# Patient Record
Sex: MALE | Race: WHITE | NOT HISPANIC OR LATINO | Employment: FULL TIME | URBAN - METROPOLITAN AREA
[De-identification: names, ages, dates, MRNs, and addresses within clinical notes are randomized per-mention and may not be internally consistent; named-entity substitution may affect disease eponyms.]

---

## 2022-10-05 ENCOUNTER — HOSPITAL ENCOUNTER (EMERGENCY)
Facility: HOSPITAL | Age: 52
Discharge: HOME | End: 2022-10-05
Attending: EMERGENCY MEDICINE | Admitting: EMERGENCY MEDICINE

## 2022-10-05 VITALS
SYSTOLIC BLOOD PRESSURE: 121 MMHG | BODY MASS INDEX: 25.59 KG/M2 | RESPIRATION RATE: 18 BRPM | DIASTOLIC BLOOD PRESSURE: 78 MMHG | HEIGHT: 71 IN | TEMPERATURE: 98 F | WEIGHT: 182.76 LBS | OXYGEN SATURATION: 96 % | HEART RATE: 63 BPM

## 2022-10-05 DIAGNOSIS — R55 NEAR SYNCOPE: ICD-10-CM

## 2022-10-05 DIAGNOSIS — M54.9 ACUTE BACK PAIN, UNSPECIFIED BACK LOCATION, UNSPECIFIED BACK PAIN LATERALITY: ICD-10-CM

## 2022-10-05 DIAGNOSIS — U07.1 COVID: Primary | ICD-10-CM

## 2022-10-05 LAB
ALBUMIN SERPL-MCNC: 4.6 G/DL (ref 3.5–5.2)
ALBUMIN/GLOB SERPL: 1.8 G/DL
ALP SERPL-CCNC: 84 U/L (ref 39–117)
ALT SERPL W P-5'-P-CCNC: 43 U/L (ref 1–41)
ANION GAP SERPL CALCULATED.3IONS-SCNC: 8.5 MMOL/L (ref 5–15)
AST SERPL-CCNC: 39 U/L (ref 1–40)
BASOPHILS # BLD AUTO: 0.03 10*3/MM3 (ref 0–0.2)
BASOPHILS NFR BLD AUTO: 0.6 % (ref 0–1.5)
BILIRUB SERPL-MCNC: 0.5 MG/DL (ref 0–1.2)
BUN SERPL-MCNC: 14 MG/DL (ref 6–20)
BUN/CREAT SERPL: 13.7 (ref 7–25)
CALCIUM SPEC-SCNC: 8.9 MG/DL (ref 8.6–10.5)
CHLORIDE SERPL-SCNC: 100 MMOL/L (ref 98–107)
CO2 SERPL-SCNC: 26.5 MMOL/L (ref 22–29)
CREAT SERPL-MCNC: 1.02 MG/DL (ref 0.76–1.27)
DEPRECATED RDW RBC AUTO: 44.2 FL (ref 37–54)
EGFRCR SERPLBLD CKD-EPI 2021: 89 ML/MIN/1.73
EOSINOPHIL # BLD AUTO: 0.05 10*3/MM3 (ref 0–0.4)
EOSINOPHIL NFR BLD AUTO: 1 % (ref 0.3–6.2)
ERYTHROCYTE [DISTWIDTH] IN BLOOD BY AUTOMATED COUNT: 12.9 % (ref 12.3–15.4)
GLOBULIN UR ELPH-MCNC: 2.5 GM/DL
GLUCOSE SERPL-MCNC: 140 MG/DL (ref 65–99)
HCT VFR BLD AUTO: 46.1 % (ref 37.5–51)
HGB BLD-MCNC: 16.3 G/DL (ref 13–17.7)
HOLD SPECIMEN: NORMAL
HOLD SPECIMEN: NORMAL
IMM GRANULOCYTES # BLD AUTO: 0.01 10*3/MM3 (ref 0–0.05)
IMM GRANULOCYTES NFR BLD AUTO: 0.2 % (ref 0–0.5)
LYMPHOCYTES # BLD AUTO: 1.46 10*3/MM3 (ref 0.7–3.1)
LYMPHOCYTES NFR BLD AUTO: 29.6 % (ref 19.6–45.3)
MAGNESIUM SERPL-MCNC: 2.1 MG/DL (ref 1.6–2.6)
MCH RBC QN AUTO: 33.5 PG (ref 26.6–33)
MCHC RBC AUTO-ENTMCNC: 35.4 G/DL (ref 31.5–35.7)
MCV RBC AUTO: 94.9 FL (ref 79–97)
MONOCYTES # BLD AUTO: 0.36 10*3/MM3 (ref 0.1–0.9)
MONOCYTES NFR BLD AUTO: 7.3 % (ref 5–12)
NEUTROPHILS NFR BLD AUTO: 3.02 10*3/MM3 (ref 1.7–7)
NEUTROPHILS NFR BLD AUTO: 61.3 % (ref 42.7–76)
NRBC BLD AUTO-RTO: 0 /100 WBC (ref 0–0.2)
PLATELET # BLD AUTO: 122 10*3/MM3 (ref 140–450)
PMV BLD AUTO: 10.9 FL (ref 6–12)
POTASSIUM SERPL-SCNC: 4.6 MMOL/L (ref 3.5–5.2)
PROT SERPL-MCNC: 7.1 G/DL (ref 6–8.5)
QT INTERVAL: 404 MS
RBC # BLD AUTO: 4.86 10*6/MM3 (ref 4.14–5.8)
SODIUM SERPL-SCNC: 135 MMOL/L (ref 136–145)
TROPONIN T SERPL-MCNC: <0.01 NG/ML (ref 0–0.03)
WBC NRBC COR # BLD: 4.93 10*3/MM3 (ref 3.4–10.8)
WHOLE BLOOD HOLD COAG: NORMAL
WHOLE BLOOD HOLD SPECIMEN: NORMAL

## 2022-10-05 PROCEDURE — 93005 ELECTROCARDIOGRAM TRACING: CPT

## 2022-10-05 PROCEDURE — 83735 ASSAY OF MAGNESIUM: CPT | Performed by: EMERGENCY MEDICINE

## 2022-10-05 PROCEDURE — 84484 ASSAY OF TROPONIN QUANT: CPT | Performed by: EMERGENCY MEDICINE

## 2022-10-05 PROCEDURE — 80053 COMPREHEN METABOLIC PANEL: CPT | Performed by: EMERGENCY MEDICINE

## 2022-10-05 PROCEDURE — 85025 COMPLETE CBC W/AUTO DIFF WBC: CPT | Performed by: EMERGENCY MEDICINE

## 2022-10-05 PROCEDURE — 93010 ELECTROCARDIOGRAM REPORT: CPT | Performed by: SPECIALIST

## 2022-10-05 PROCEDURE — 99284 EMERGENCY DEPT VISIT MOD MDM: CPT

## 2022-10-05 PROCEDURE — 93005 ELECTROCARDIOGRAM TRACING: CPT | Performed by: EMERGENCY MEDICINE

## 2022-10-05 RX ORDER — SODIUM CHLORIDE 0.9 % (FLUSH) 0.9 %
10 SYRINGE (ML) INJECTION AS NEEDED
Status: DISCONTINUED | OUTPATIENT
Start: 2022-10-05 | End: 2022-10-05 | Stop reason: HOSPADM

## 2022-10-05 RX ADMIN — SODIUM CHLORIDE 1000 ML: 9 INJECTION, SOLUTION INTRAVENOUS at 07:02

## 2022-10-05 NOTE — ED PROVIDER NOTES
Time: 7:26 AM EDT  Arrived by: EMS  Chief Complaint: Syncope  History provided by: patient/EMS  History is limited by: N/A    History of Present Illness:  Patient is a 52 y.o. year old male who presents to the emergency department with syncope. Patient states he is day five of Covid and was diagnosed at Acute Care. Patient states he was drinking coffee this morning when he began having hot flashes after he stretched and began having back pain and he broke into a cold sweat. Patient states he did not pass out. Patient states he feels fine and is back to normal right now. Patient states has pain from Covid from butt to back to crown of skull. Patient states vomited earlier, but is not nausea right now. Patient states he has Ibuprofen for the pain.    Patient denies taking blood pressure medicine.      History provided by:  Patient and EMS personnel   used: No        Patient Care Team  Primary Care Provider: Chrissy Aragon APRN    Past Medical History:     No Known Allergies  History reviewed. No pertinent past medical history.  No past surgical history on file.  History reviewed. No pertinent family history.    Home Medications:  Prior to Admission medications    Not on File        Social History:      Recent travel: not applicable    Review of Systems:  Review of Systems   Constitutional: Negative for chills and fever.        Hot flashes and cold sweat   HENT: Negative for sore throat.    Eyes: Negative for photophobia.   Respiratory: Negative for shortness of breath.    Cardiovascular: Negative for chest pain.   Gastrointestinal: Positive for nausea (Resolved) and vomiting (Resolved). Negative for abdominal pain and diarrhea.   Genitourinary: Negative for dysuria.   Musculoskeletal: Positive for back pain. Negative for neck pain.   Skin: Negative for wound.   Neurological: Positive for syncope. Negative for headaches.   All other systems reviewed and are negative.       Physical Exam:  BP  "112/85   Pulse 72   Temp 98 °F (36.7 °C) (Oral)   Resp 18   Ht 180.3 cm (71\")   Wt 82.9 kg (182 lb 12.2 oz)   SpO2 96%   BMI 25.49 kg/m²     Physical Exam  Vitals and nursing note reviewed.   Constitutional:       General: He is not in acute distress.  HENT:      Head: Normocephalic and atraumatic.   Eyes:      Extraocular Movements: Extraocular movements intact.   Cardiovascular:      Rate and Rhythm: Normal rate and regular rhythm.      Pulses: Normal pulses.      Heart sounds:     No friction rub. No gallop.   Pulmonary:      Effort: Pulmonary effort is normal. No respiratory distress.      Breath sounds: Normal breath sounds. No decreased breath sounds, wheezing, rhonchi or rales.   Abdominal:      General: Abdomen is flat. Bowel sounds are normal. There is no distension.      Palpations: Abdomen is soft.      Tenderness: There is no abdominal tenderness. There is no guarding or rebound.   Musculoskeletal:         General: Normal range of motion.      Cervical back: Normal range of motion and neck supple.   Skin:     General: Skin is warm and dry.      Capillary Refill: Capillary refill takes less than 2 seconds.   Neurological:      Mental Status: He is alert and oriented to person, place, and time. Mental status is at baseline.                Medications in the Emergency Department:  Medications   sodium chloride 0.9 % flush 10 mL (has no administration in time range)   sodium chloride 0.9 % bolus 1,000 mL (0 mL Intravenous Stopped 10/5/22 0833)        Labs  Lab Results (last 24 hours)     Procedure Component Value Units Date/Time    CBC & Differential [[id number]]  (Abnormal) Collected: 10/05/22 0645    Specimen: Blood Updated: 10/05/22 0658    Narrative:      The following orders were created for panel order CBC & Differential.  Procedure                               Abnormality         Status                     ---------                               -----------         ------                   "   CBC Auto Differential[[id number]]        Abnormal            Final result               Scan Slide[[id number]]                                                                    Please view results for these tests on the individual orders.    Comprehensive Metabolic Panel [[id number]]  (Abnormal) Collected: 10/05/22 0645    Specimen: Blood Updated: 10/05/22 0715     Glucose 140 mg/dL      BUN 14 mg/dL      Creatinine 1.02 mg/dL      Sodium 135 mmol/L      Potassium 4.6 mmol/L      Comment: Slight hemolysis detected by analyzer. Results may be affected.        Chloride 100 mmol/L      CO2 26.5 mmol/L      Calcium 8.9 mg/dL      Total Protein 7.1 g/dL      Albumin 4.60 g/dL      ALT (SGPT) 43 U/L      AST (SGOT) 39 U/L      Alkaline Phosphatase 84 U/L      Total Bilirubin 0.5 mg/dL      Globulin 2.5 gm/dL      A/G Ratio 1.8 g/dL      BUN/Creatinine Ratio 13.7     Anion Gap 8.5 mmol/L      eGFR 89.0 mL/min/1.73      Comment: National Kidney Foundation and American Society of Nephrology (ASN) Task Force recommended calculation based on the Chronic Kidney Disease Epidemiology Collaboration (CKD-EPI) equation refit without adjustment for race.       Narrative:      GFR Normal >60  Chronic Kidney Disease <60  Kidney Failure <15      Magnesium [[id number]]  (Normal) Collected: 10/05/22 0645    Specimen: Blood Updated: 10/05/22 0715     Magnesium 2.1 mg/dL     Troponin [[id number]]  (Normal) Collected: 10/05/22 0645    Specimen: Blood Updated: 10/05/22 0715     Troponin T <0.010 ng/mL     Narrative:      Troponin T Reference Range:  <= 0.03 ng/mL-   Negative for AMI  >0.03 ng/mL-     Abnormal for myocardial necrosis.  Clinicians would have to utilize clinical acumen, EKG, Troponin and serial changes to determine if it is an Acute Myocardial Infarction or myocardial injury due to an underlying chronic condition.       Results may be falsely decreased if patient taking Biotin.      CBC Auto Differential [[id number]]  (Abnormal)  Collected: 10/05/22 0645    Specimen: Blood Updated: 10/05/22 0658     WBC 4.93 10*3/mm3      RBC 4.86 10*6/mm3      Hemoglobin 16.3 g/dL      Hematocrit 46.1 %      MCV 94.9 fL      MCH 33.5 pg      MCHC 35.4 g/dL      RDW 12.9 %      RDW-SD 44.2 fl      MPV 10.9 fL      Platelets 122 10*3/mm3      Neutrophil % 61.3 %      Lymphocyte % 29.6 %      Monocyte % 7.3 %      Eosinophil % 1.0 %      Basophil % 0.6 %      Immature Grans % 0.2 %      Neutrophils, Absolute 3.02 10*3/mm3      Lymphocytes, Absolute 1.46 10*3/mm3      Monocytes, Absolute 0.36 10*3/mm3      Eosinophils, Absolute 0.05 10*3/mm3      Basophils, Absolute 0.03 10*3/mm3      Immature Grans, Absolute 0.01 10*3/mm3      nRBC 0.0 /100 WBC            Imaging:  No Radiology Exams Resulted Within Past 24 Hours    Procedures:  Procedures    Progress  ED Course as of 10/05/22 0901   Wed Oct 05, 2022   0652 EKG:    Rhythm: Normal sinus rhythm  Rate: 66  Intervals: IVCD  T-wave: Low amplitude  ST Segment: Nonspecific    EKG Comparison: Not available    Interpreted by me   [NL]      ED Course User Index  [NL] Orlando Magdaleno DO                            Medical Decision Making:  MDM   52-year-old male patient who tested positive for COVID 5 days ago presented this morning after having a near syncopal episode.  Patient states he was drinking coffee and had a hot flash and then a cold chill.  He became nauseated and vomited.  He felt like he might pass out but did not.  Patient's exam is normal.  After receiving 1 Liter of IV fluid he states he feels fine.  He states he has had some back pain over the last 5 days.  He declined taking any pain medication.  Stable for discharge.      Final diagnoses:   COVID   Near syncope   Acute back pain, unspecified back location, unspecified back pain laterality        Disposition:  ED Disposition     ED Disposition   Discharge    Condition   Stable    Comment   --             This medical record created using voice recognition  software and a virtual scribe.    Documentation assistance provided by James Tinoco acting as scribe for Orlando Magdaleno DO, MD. Information recorded by the scribe was done at my direction and has been verified and validated by me.         James Tinoco  10/05/22 0731       James Tinoco  10/05/22 0824       Orlando Magdaleno DO  10/05/22 0901

## 2023-05-02 ENCOUNTER — HOSPITAL ENCOUNTER (OUTPATIENT)
Dept: OTHER | Facility: HOSPITAL | Age: 53
Discharge: HOME | End: 2023-05-02

## 2025-01-17 RX ORDER — PREDNISONE 10 MG/1
10 TABLET ORAL DAILY
Qty: 14 TABLET | Refills: 0 | Status: SHIPPED | OUTPATIENT
Start: 2025-01-17

## 2025-01-17 RX ORDER — AZITHROMYCIN 250 MG/1
TABLET, FILM COATED ORAL
Qty: 6 TABLET | Refills: 0 | Status: SHIPPED | OUTPATIENT
Start: 2025-01-17 | End: 2025-01-22

## 2025-01-23 ENCOUNTER — OFFICE VISIT (OUTPATIENT)
Dept: INTERNAL MEDICINE | Age: 55
End: 2025-01-23
Payer: COMMERCIAL

## 2025-01-23 VITALS
HEART RATE: 80 BPM | WEIGHT: 182 LBS | TEMPERATURE: 98.7 F | SYSTOLIC BLOOD PRESSURE: 138 MMHG | DIASTOLIC BLOOD PRESSURE: 93 MMHG | OXYGEN SATURATION: 95 % | BODY MASS INDEX: 25.48 KG/M2 | HEIGHT: 71 IN

## 2025-01-23 DIAGNOSIS — Z12.5 PROSTATE CANCER SCREENING: ICD-10-CM

## 2025-01-23 DIAGNOSIS — R21 RASH: ICD-10-CM

## 2025-01-23 DIAGNOSIS — E78.2 MIXED HYPERLIPIDEMIA: Primary | ICD-10-CM

## 2025-01-23 DIAGNOSIS — Z76.89 ENCOUNTER TO ESTABLISH CARE: ICD-10-CM

## 2025-01-23 DIAGNOSIS — E55.9 VITAMIN D DEFICIENCY: ICD-10-CM

## 2025-01-23 DIAGNOSIS — L30.9 ECZEMA, UNSPECIFIED TYPE: ICD-10-CM

## 2025-01-23 PROCEDURE — 99204 OFFICE O/P NEW MOD 45 MIN: CPT | Performed by: NURSE PRACTITIONER

## 2025-01-23 RX ORDER — PREDNISONE 10 MG/1
10 TABLET ORAL DAILY
Qty: 14 TABLET | Refills: 0 | Status: SHIPPED | OUTPATIENT
Start: 2025-01-23

## 2025-01-23 RX ORDER — BETAMETHASONE DIPROPIONATE 0.5 MG/G
1 CREAM TOPICAL 2 TIMES DAILY
Qty: 45 G | Refills: 0 | Status: SHIPPED | OUTPATIENT
Start: 2025-01-23

## 2025-01-23 NOTE — PROGRESS NOTES
"Chief Complaint  Cough (Wife had bronchitis last week but feels better now ), Establish Care, and Herpes Zoster (Pt thinks he has shingles now. He has a rash on his neck and lower back )    Subjective      Darrius Florentino is a 54 y.o. male who presents to [facility]     History of Present Illness  The patient presents to be established here at the [facility] office he is prior patient of [facility] and would like to establish with [facility].  Recent episode of bronchitis currently on Z-Live and prednisone but has noticed a persistent rash to his neck which he originally thought was a ringworm he has been using antifungal cream and it is progressively gotten worse     He initially suspected a ringworm infection last week but has since ruled this out due to the persistence of symptoms despite treatment. The rash, which began as a circular lesion, has now spread and is characterized by a burning sensation and a rough texture similar to sandpaper. He reports no radiating pain down his shoulder or arm. The rash occasionally itches and burns without any identifiable trigger, or lesions.  No blisters no drainage no bumps but feels rough in nature.      His cough has improved he is a smoker recently diagnosed with bronchitis was on Zithromax and prednisone doing much better no wheezing  or increased shortness of air continues with mild congestion.    He has a history of hyperlipidemia vitamin D deficiency and is a current smoker he would like to have lab work and have his physical we will get it set up        FAMILY HISTORY  His mother had diabetes during her cancer treatment. His brother is diabetic.    MEDICATIONS  Current: Z-Live, steroid (10 mg), Lotrimin or Lotrisone         Objective   Vital Signs:   Vitals:    01/23/25 1454   BP: 138/93   Pulse: 80   Temp: 98.7 °F (37.1 °C)   SpO2: 95%   Weight: 82.6 kg (182 lb)   Height: 180.3 cm (71\")     Body mass index is 25.38 kg/m².    Wt Readings " from Last 3 Encounters:   01/23/25 82.6 kg (182 lb)   10/05/22 82.9 kg (182 lb 12.2 oz)     BP Readings from Last 3 Encounters:   01/23/25 138/93   10/05/22 121/78       Health Maintenance   Topic Date Due    BMI FOLLOWUP  Never done    TDAP/TD VACCINES (1 - Tdap) Never done    ZOSTER VACCINE (1 of 2) Never done    INFLUENZA VACCINE  Never done    COVID-19 Vaccine (1 - 2024-25 season) Never done    ANNUAL PHYSICAL  Never done    LIPID PANEL  03/07/2025    COLORECTAL CANCER SCREENING  04/21/2026    HEPATITIS C SCREENING  Completed    Pneumococcal Vaccine 0-64  Aged Out       Past Medical History:   Diagnosis Date    Allergic     Seizures      Past Surgical History:   Procedure Laterality Date    HERNIA REPAIR       History reviewed. No pertinent family history.  Social History     Socioeconomic History    Marital status:    Tobacco Use    Smoking status: Every Day     Current packs/day: 1.00     Average packs/day: 1 pack/day for 38.9 years (38.9 ttl pk-yrs)     Types: Cigarettes     Start date: 3/11/1986     Passive exposure: Current    Smokeless tobacco: Never   Vaping Use    Vaping status: Never Used   Substance and Sexual Activity    Alcohol use: Never    Drug use: Never    Sexual activity: Defer       Current Outpatient Medications   Medication Instructions    betamethasone dipropionate (DIPROSONE) 0.05 % cream 1 Application, Topical, 2 Times Daily    predniSONE (DELTASONE) 10 mg, Oral, Daily       Medications Discontinued During This Encounter   Medication Reason    azithromycin (Zithromax Z-Live) 250 MG tablet *Therapy completed    predniSONE (DELTASONE) 10 MG tablet Historical Med - Therapy completed        Physical Exam  Vitals and nursing note reviewed.   Constitutional:       Appearance: Normal appearance.   HENT:      Head: Normocephalic.   Eyes:      Extraocular Movements: Extraocular movements intact.      Pupils: Pupils are equal, round, and reactive to light.   Cardiovascular:      Rate and  Rhythm: Normal rate and regular rhythm.      Pulses: Normal pulses.   Pulmonary:      Effort: Pulmonary effort is normal.      Breath sounds: Normal breath sounds.   Abdominal:      Palpations: Abdomen is soft.   Musculoskeletal:         General: Normal range of motion.      Cervical back: Normal range of motion.   Skin:     General: Skin is warm and dry.      Comments: Dry erythematous patch noted on left lateral neck does not follow dermatome no lesions more plaque than scaly area   Neurological:      General: No focal deficit present.      Mental Status: He is alert.   Psychiatric:         Mood and Affect: Mood normal.         Behavior: Behavior normal.         Thought Content: Thought content normal.          Physical Exam  Lungs were auscultated.       Result Review :  The following data was reviewed by: ZOIE Christianson on 01/23/2025:    Labs  No visits with results within 2 Month(s) from this visit.   Latest known visit with results is:   Admission on 10/05/2022, Discharged on 10/05/2022   Component Date Value Ref Range Status    QT Interval 10/05/2022 404  ms Final    Glucose 10/05/2022 140 (H)  65 - 99 mg/dL Final    BUN 10/05/2022 14  6 - 20 mg/dL Final    Creatinine 10/05/2022 1.02  0.76 - 1.27 mg/dL Final    Sodium 10/05/2022 135 (L)  136 - 145 mmol/L Final    Potassium 10/05/2022 4.6  3.5 - 5.2 mmol/L Final    Slight hemolysis detected by analyzer. Results may be affected.    Chloride 10/05/2022 100  98 - 107 mmol/L Final    CO2 10/05/2022 26.5  22.0 - 29.0 mmol/L Final    Calcium 10/05/2022 8.9  8.6 - 10.5 mg/dL Final    Total Protein 10/05/2022 7.1  6.0 - 8.5 g/dL Final    Albumin 10/05/2022 4.60  3.50 - 5.20 g/dL Final    ALT (SGPT) 10/05/2022 43 (H)  1 - 41 U/L Final    AST (SGOT) 10/05/2022 39  1 - 40 U/L Final    Alkaline Phosphatase 10/05/2022 84  39 - 117 U/L Final    Total Bilirubin 10/05/2022 0.5  0.0 - 1.2 mg/dL Final    Globulin 10/05/2022 2.5  gm/dL Final    A/G Ratio 10/05/2022  1.8  g/dL Final    BUN/Creatinine Ratio 10/05/2022 13.7  7.0 - 25.0 Final    Anion Gap 10/05/2022 8.5  5.0 - 15.0 mmol/L Final    eGFR 10/05/2022 89.0  >60.0 mL/min/1.73 Final    National Kidney Foundation and American Society of Nephrology (ASN) Task Force recommended calculation based on the Chronic Kidney Disease Epidemiology Collaboration (CKD-EPI) equation refit without adjustment for race.    Magnesium 10/05/2022 2.1  1.6 - 2.6 mg/dL Final    Troponin T 10/05/2022 <0.010  0.000 - 0.030 ng/mL Final    Extra Tube 10/05/2022 Hold for add-ons.   Final    Auto resulted.    Extra Tube 10/05/2022 hold for add-on   Final    Auto resulted    Extra Tube 10/05/2022 Hold for add-ons.   Final    Auto resulted.    Extra Tube 10/05/2022 Hold for add-ons.   Final    Auto resulted    WBC 10/05/2022 4.93  3.40 - 10.80 10*3/mm3 Final    RBC 10/05/2022 4.86  4.14 - 5.80 10*6/mm3 Final    Hemoglobin 10/05/2022 16.3  13.0 - 17.7 g/dL Final    Hematocrit 10/05/2022 46.1  37.5 - 51.0 % Final    MCV 10/05/2022 94.9  79.0 - 97.0 fL Final    MCH 10/05/2022 33.5 (H)  26.6 - 33.0 pg Final    MCHC 10/05/2022 35.4  31.5 - 35.7 g/dL Final    RDW 10/05/2022 12.9  12.3 - 15.4 % Final    RDW-SD 10/05/2022 44.2  37.0 - 54.0 fl Final    MPV 10/05/2022 10.9  6.0 - 12.0 fL Final    Platelets 10/05/2022 122 (L)  140 - 450 10*3/mm3 Final    Neutrophil % 10/05/2022 61.3  42.7 - 76.0 % Final    Lymphocyte % 10/05/2022 29.6  19.6 - 45.3 % Final    Monocyte % 10/05/2022 7.3  5.0 - 12.0 % Final    Eosinophil % 10/05/2022 1.0  0.3 - 6.2 % Final    Basophil % 10/05/2022 0.6  0.0 - 1.5 % Final    Immature Grans % 10/05/2022 0.2  0.0 - 0.5 % Final    Neutrophils, Absolute 10/05/2022 3.02  1.70 - 7.00 10*3/mm3 Final    Lymphocytes, Absolute 10/05/2022 1.46  0.70 - 3.10 10*3/mm3 Final    Monocytes, Absolute 10/05/2022 0.36  0.10 - 0.90 10*3/mm3 Final    Eosinophils, Absolute 10/05/2022 0.05  0.00 - 0.40 10*3/mm3 Final    Basophils, Absolute 10/05/2022 0.03   0.00 - 0.20 10*3/mm3 Final    Immature Grans, Absolute 10/05/2022 0.01  0.00 - 0.05 10*3/mm3 Final    nRBC 10/05/2022 0.0  0.0 - 0.2 /100 WBC Final       Imaging  No Images in the past 120 days found..    Results         Procedures       ASSESSMENT & PLAN  Diagnoses and all orders for this visit:    1. Mixed hyperlipidemia (Primary)  -     Vitamin D,25-Hydroxy; Future  -     TSH Rfx On Abnormal To Free T4; Future  -     Lipid Panel; Future  -     Comprehensive Metabolic Panel; Future  -     CBC & Differential; Future  -     Hepatitis C Antibody; Future  -     Hemoglobin A1c; Future  -     PSA Screen; Future    2. Vitamin D deficiency  -     Vitamin D,25-Hydroxy; Future  -     TSH Rfx On Abnormal To Free T4; Future  -     Lipid Panel; Future  -     Comprehensive Metabolic Panel; Future  -     CBC & Differential; Future  -     Hepatitis C Antibody; Future  -     Hemoglobin A1c; Future  -     PSA Screen; Future    3. Prostate cancer screening  -     Vitamin D,25-Hydroxy; Future  -     TSH Rfx On Abnormal To Free T4; Future  -     Lipid Panel; Future  -     Comprehensive Metabolic Panel; Future  -     CBC & Differential; Future  -     Hepatitis C Antibody; Future  -     Hemoglobin A1c; Future  -     PSA Screen; Future    4. Encounter to establish care  -     Vitamin D,25-Hydroxy; Future  -     TSH Rfx On Abnormal To Free T4; Future  -     Lipid Panel; Future  -     Comprehensive Metabolic Panel; Future  -     CBC & Differential; Future  -     Hepatitis C Antibody; Future  -     Hemoglobin A1c; Future  -     PSA Screen; Future    5. Rash    6. Eczema, unspecified type    Other orders  -     betamethasone dipropionate (DIPROSONE) 0.05 % cream; Apply 1 Application topically to the appropriate area as directed 2 (Two) Times a Day.  Dispense: 45 g; Refill: 0  -     predniSONE (DELTASONE) 10 MG tablet; Take 1 tablet by mouth Daily.  Dispense: 14 tablet; Refill: 0         Assessment & Plan  1. Rash.  The absence of vesicles  and a dermatome pattern suggests that the rash is unlikely to be shingles. He is advised to discontinue the use of Lotrimin or Lotrisone cream and instead apply a different prescribed cream to the affected area. The current steroid regimen will be extended to see if it helps. If there is no improvement in the rash, a referral to dermatology may be considered.    2. Elevated cholesterol.  Blood work will be ordered to monitor his cholesterol levels. He is advised to complete the blood work within the next few weeks.    3. Cough.  His cough has significantly improved with the current treatment of a Z-Live and steroids.    Follow-up  The patient will follow up for a wellness visit.                  FOLLOW UP  Return in about 2 weeks (around 2/6/2025) for Annual physical.  Patient was given instructions and counseling regarding his condition or for health maintenance advice. Please see specific information pulled into the AVS if appropriate.     Patient or patient representative verbalized consent for the use of Ambient Listening during the visit with  ZOIE Christianson for chart documentation. 1/23/2025  15:24 EST    ZOIE Christianson  01/23/25  15:24 EST

## 2025-01-23 NOTE — PATIENT INSTRUCTIONS
Will extend steroids for rash  Use the steroid cream sparingly twice a day to the area  See back in 2 weeks after lab work for annual physical  Encouraged not to smoke

## 2025-02-10 ENCOUNTER — LAB (OUTPATIENT)
Dept: LAB | Facility: HOSPITAL | Age: 55
End: 2025-02-10
Payer: COMMERCIAL

## 2025-02-10 DIAGNOSIS — E55.9 VITAMIN D DEFICIENCY: ICD-10-CM

## 2025-02-10 DIAGNOSIS — Z76.89 ENCOUNTER TO ESTABLISH CARE: ICD-10-CM

## 2025-02-10 DIAGNOSIS — Z12.5 PROSTATE CANCER SCREENING: ICD-10-CM

## 2025-02-10 DIAGNOSIS — E78.2 MIXED HYPERLIPIDEMIA: ICD-10-CM

## 2025-02-10 LAB
25(OH)D3 SERPL-MCNC: 13.8 NG/ML (ref 30–100)
ALBUMIN SERPL-MCNC: 4.3 G/DL (ref 3.5–5.2)
ALBUMIN/GLOB SERPL: 1.7 G/DL
ALP SERPL-CCNC: 88 U/L (ref 39–117)
ALT SERPL W P-5'-P-CCNC: 25 U/L (ref 1–41)
ANION GAP SERPL CALCULATED.3IONS-SCNC: 6 MMOL/L (ref 5–15)
AST SERPL-CCNC: 27 U/L (ref 1–40)
BASOPHILS # BLD AUTO: 0.07 10*3/MM3 (ref 0–0.2)
BASOPHILS NFR BLD AUTO: 1 % (ref 0–1.5)
BILIRUB SERPL-MCNC: 0.4 MG/DL (ref 0–1.2)
BUN SERPL-MCNC: 7 MG/DL (ref 6–20)
BUN/CREAT SERPL: 7.7 (ref 7–25)
CALCIUM SPEC-SCNC: 9.5 MG/DL (ref 8.6–10.5)
CHLORIDE SERPL-SCNC: 103 MMOL/L (ref 98–107)
CHOLEST SERPL-MCNC: 183 MG/DL (ref 0–200)
CO2 SERPL-SCNC: 27 MMOL/L (ref 22–29)
CREAT SERPL-MCNC: 0.91 MG/DL (ref 0.76–1.27)
DEPRECATED RDW RBC AUTO: 40.9 FL (ref 37–54)
EGFRCR SERPLBLD CKD-EPI 2021: 100.8 ML/MIN/1.73
EOSINOPHIL # BLD AUTO: 0.26 10*3/MM3 (ref 0–0.4)
EOSINOPHIL NFR BLD AUTO: 3.7 % (ref 0.3–6.2)
ERYTHROCYTE [DISTWIDTH] IN BLOOD BY AUTOMATED COUNT: 12.5 % (ref 12.3–15.4)
GLOBULIN UR ELPH-MCNC: 2.6 GM/DL
GLUCOSE SERPL-MCNC: 94 MG/DL (ref 65–99)
HBA1C MFR BLD: 5.2 % (ref 4.8–5.6)
HCT VFR BLD AUTO: 42.6 % (ref 37.5–51)
HCV AB SER QL: NORMAL
HDLC SERPL-MCNC: 34 MG/DL (ref 40–60)
HGB BLD-MCNC: 15.2 G/DL (ref 13–17.7)
IMM GRANULOCYTES # BLD AUTO: 0.03 10*3/MM3 (ref 0–0.05)
IMM GRANULOCYTES NFR BLD AUTO: 0.4 % (ref 0–0.5)
LDLC SERPL CALC-MCNC: 104 MG/DL (ref 0–100)
LDLC/HDLC SERPL: 2.84 {RATIO}
LYMPHOCYTES # BLD AUTO: 1.96 10*3/MM3 (ref 0.7–3.1)
LYMPHOCYTES NFR BLD AUTO: 28 % (ref 19.6–45.3)
MCH RBC QN AUTO: 33 PG (ref 26.6–33)
MCHC RBC AUTO-ENTMCNC: 35.7 G/DL (ref 31.5–35.7)
MCV RBC AUTO: 92.4 FL (ref 79–97)
MONOCYTES # BLD AUTO: 0.53 10*3/MM3 (ref 0.1–0.9)
MONOCYTES NFR BLD AUTO: 7.6 % (ref 5–12)
NEUTROPHILS NFR BLD AUTO: 4.16 10*3/MM3 (ref 1.7–7)
NEUTROPHILS NFR BLD AUTO: 59.3 % (ref 42.7–76)
NRBC BLD AUTO-RTO: 0 /100 WBC (ref 0–0.2)
PLATELET # BLD AUTO: 206 10*3/MM3 (ref 140–450)
PMV BLD AUTO: 10.6 FL (ref 6–12)
POTASSIUM SERPL-SCNC: 4.6 MMOL/L (ref 3.5–5.2)
PROT SERPL-MCNC: 6.9 G/DL (ref 6–8.5)
PSA SERPL-MCNC: 0.71 NG/ML (ref 0–4)
RBC # BLD AUTO: 4.61 10*6/MM3 (ref 4.14–5.8)
SODIUM SERPL-SCNC: 136 MMOL/L (ref 136–145)
TRIGL SERPL-MCNC: 262 MG/DL (ref 0–150)
TSH SERPL DL<=0.05 MIU/L-ACNC: 1.29 UIU/ML (ref 0.27–4.2)
VLDLC SERPL-MCNC: 45 MG/DL (ref 5–40)
WBC NRBC COR # BLD AUTO: 7.01 10*3/MM3 (ref 3.4–10.8)

## 2025-02-10 PROCEDURE — G0103 PSA SCREENING: HCPCS

## 2025-02-10 PROCEDURE — 82306 VITAMIN D 25 HYDROXY: CPT

## 2025-02-10 PROCEDURE — 80061 LIPID PANEL: CPT

## 2025-02-10 PROCEDURE — 83036 HEMOGLOBIN GLYCOSYLATED A1C: CPT

## 2025-02-10 PROCEDURE — 80050 GENERAL HEALTH PANEL: CPT

## 2025-02-10 PROCEDURE — 36415 COLL VENOUS BLD VENIPUNCTURE: CPT

## 2025-02-10 PROCEDURE — 86803 HEPATITIS C AB TEST: CPT

## 2025-02-11 ENCOUNTER — OFFICE VISIT (OUTPATIENT)
Age: 55
End: 2025-02-11
Payer: COMMERCIAL

## 2025-02-11 VITALS
DIASTOLIC BLOOD PRESSURE: 62 MMHG | OXYGEN SATURATION: 99 % | BODY MASS INDEX: 25.81 KG/M2 | TEMPERATURE: 97.8 F | WEIGHT: 184.4 LBS | SYSTOLIC BLOOD PRESSURE: 118 MMHG | HEIGHT: 71 IN | HEART RATE: 78 BPM

## 2025-02-11 DIAGNOSIS — J44.9 CHRONIC OBSTRUCTIVE PULMONARY DISEASE, UNSPECIFIED COPD TYPE: ICD-10-CM

## 2025-02-11 DIAGNOSIS — E55.9 VITAMIN D DEFICIENCY: ICD-10-CM

## 2025-02-11 DIAGNOSIS — Z00.00 WELLNESS EXAMINATION: ICD-10-CM

## 2025-02-11 DIAGNOSIS — F17.200 CURRENT EVERY DAY SMOKER: ICD-10-CM

## 2025-02-11 DIAGNOSIS — E78.2 MIXED HYPERLIPIDEMIA: Primary | ICD-10-CM

## 2025-02-11 DIAGNOSIS — Z76.89 ENCOUNTER TO ESTABLISH CARE: ICD-10-CM

## 2025-02-11 PROCEDURE — 99396 PREV VISIT EST AGE 40-64: CPT | Performed by: NURSE PRACTITIONER

## 2025-02-11 RX ORDER — ACETAMINOPHEN 160 MG
2000 TABLET,DISINTEGRATING ORAL DAILY
Qty: 90 CAPSULE | Refills: 3 | Status: SHIPPED | OUTPATIENT
Start: 2025-02-11 | End: 2026-02-11

## 2025-02-11 NOTE — PROGRESS NOTES
"Chief Complaint  Follow-up (Follow up for labs , Coughing alot)    Subjective      Darrius Florentino is a 54 y.o. male who presents to [facility] Darrius's along term patient of mine from the [facility] he is here to establish care he has a history of hyperlipidemia vitamin D deficiency and is a current everyday smoker.  He did have labs prior to his visit his cholesterol  continues to have elevated triglycerides of 262 total cholesterol is 183 with  his HDL is low at 34.    History of Present Illness  T    He has been experiencing elevated triglyceride levels, which he attributes to the intake of multivitamins. He reports a significant increase in his triglyceride levels from 200 to over 900 while on these vitamins.  Triglycerides of 200+ is an improvement.  Encouraged him to watch simple sugars and continue to monitor his diet increase fiber decrease fat and we will recheck labs in 6 months he is agreeable      He is due for his low-dose CT scan encouraged not to smoke      His vitamin D is low he is willing to take a supplement we will send it over  SOCIAL HISTORY  He does not drink alcohol.    MEDICATIONS           Objective   Vital Signs:   Vitals:    02/11/25 1607   BP: 118/62   Pulse: 78   Temp: 97.8 °F (36.6 °C)   TempSrc: Oral   SpO2: 99%   Weight: 83.6 kg (184 lb 6.4 oz)   Height: 180.3 cm (70.98\")     Body mass index is 25.73 kg/m².    Wt Readings from Last 3 Encounters:   02/11/25 83.6 kg (184 lb 6.4 oz)   01/23/25 82.6 kg (182 lb)   10/05/22 82.9 kg (182 lb 12.2 oz)     BP Readings from Last 3 Encounters:   02/11/25 118/62   01/23/25 138/93   10/05/22 121/78       Health Maintenance   Topic Date Due    BMI FOLLOWUP  Never done    Pneumococcal Vaccine 50+ (1 of 2 - PCV) Never done    TDAP/TD VACCINES (1 - Tdap) Never done    ZOSTER VACCINE (1 of 2) Never done    LUNG CANCER SCREENING  Never done    INFLUENZA VACCINE  Never done    COVID-19 Vaccine (1 " - 2024-25 season) Never done    ANNUAL PHYSICAL  Never done    LIPID PANEL  02/10/2026    COLORECTAL CANCER SCREENING  04/21/2026    HEPATITIS C SCREENING  Completed       Past Medical History:   Diagnosis Date    Allergic     Seizures      Past Surgical History:   Procedure Laterality Date    HERNIA REPAIR       History reviewed. No pertinent family history.  Social History     Socioeconomic History    Marital status:    Tobacco Use    Smoking status: Every Day     Current packs/day: 1.00     Average packs/day: 1 pack/day for 38.9 years (38.9 ttl pk-yrs)     Types: Cigarettes     Start date: 3/11/1986     Passive exposure: Current    Smokeless tobacco: Never   Vaping Use    Vaping status: Never Used   Substance and Sexual Activity    Alcohol use: Never    Drug use: Never    Sexual activity: Defer       Current Outpatient Medications   Medication Instructions    betamethasone dipropionate (DIPROSONE) 0.05 % cream 1 Application, Topical, 2 Times Daily    predniSONE (DELTASONE) 10 mg, Oral, Daily    Vitamin D3 2,000 Units, Oral, Daily, Take with a fatty meal.       There are no discontinued medications.     Physical Exam  Vitals and nursing note reviewed.   Constitutional:       Appearance: Normal appearance.   HENT:      Head: Normocephalic.   Eyes:      Extraocular Movements: Extraocular movements intact.      Pupils: Pupils are equal, round, and reactive to light.   Cardiovascular:      Rate and Rhythm: Normal rate and regular rhythm.      Pulses: Normal pulses.   Pulmonary:      Effort: Pulmonary effort is normal.      Breath sounds: Normal breath sounds.   Abdominal:      Palpations: Abdomen is soft.   Musculoskeletal:         General: Normal range of motion.      Cervical back: Normal range of motion.   Skin:     General: Skin is warm and dry.   Neurological:      General: No focal deficit present.      Mental Status: He is alert.   Psychiatric:         Mood and Affect: Mood normal.         Behavior:  Behavior normal.         Thought Content: Thought content normal.          Physical Exam         Result Review :  The following data was reviewed by: ZOIE Christianson on 02/11/2025:    Labs  Lab on 02/10/2025   Component Date Value Ref Range Status    25 Hydroxy, Vitamin D 02/10/2025 13.8 (L)  30.0 - 100.0 ng/ml Final    TSH 02/10/2025 1.290  0.270 - 4.200 uIU/mL Final    Total Cholesterol 02/10/2025 183  0 - 200 mg/dL Final    Triglycerides 02/10/2025 262 (H)  0 - 150 mg/dL Final    HDL Cholesterol 02/10/2025 34 (L)  40 - 60 mg/dL Final    LDL Cholesterol  02/10/2025 104 (H)  0 - 100 mg/dL Final    VLDL Cholesterol 02/10/2025 45 (H)  5 - 40 mg/dL Final    LDL/HDL Ratio 02/10/2025 2.84   Final    Glucose 02/10/2025 94  65 - 99 mg/dL Final    BUN 02/10/2025 7  6 - 20 mg/dL Final    Creatinine 02/10/2025 0.91  0.76 - 1.27 mg/dL Final    Sodium 02/10/2025 136  136 - 145 mmol/L Final    Potassium 02/10/2025 4.6  3.5 - 5.2 mmol/L Final    Chloride 02/10/2025 103  98 - 107 mmol/L Final    CO2 02/10/2025 27.0  22.0 - 29.0 mmol/L Final    Calcium 02/10/2025 9.5  8.6 - 10.5 mg/dL Final    Total Protein 02/10/2025 6.9  6.0 - 8.5 g/dL Final    Albumin 02/10/2025 4.3  3.5 - 5.2 g/dL Final    ALT (SGPT) 02/10/2025 25  1 - 41 U/L Final    AST (SGOT) 02/10/2025 27  1 - 40 U/L Final    Alkaline Phosphatase 02/10/2025 88  39 - 117 U/L Final    Total Bilirubin 02/10/2025 0.4  0.0 - 1.2 mg/dL Final    Globulin 02/10/2025 2.6  gm/dL Final    A/G Ratio 02/10/2025 1.7  g/dL Final    BUN/Creatinine Ratio 02/10/2025 7.7  7.0 - 25.0 Final    Anion Gap 02/10/2025 6.0  5.0 - 15.0 mmol/L Final    eGFR 02/10/2025 100.8  >60.0 mL/min/1.73 Final    Hepatitis C Ab 02/10/2025 Non-Reactive  Non-Reactive Final    Hemoglobin A1C 02/10/2025 5.20  4.80 - 5.60 % Final    PSA 02/10/2025 0.710  0.000 - 4.000 ng/mL Final    WBC 02/10/2025 7.01  3.40 - 10.80 10*3/mm3 Final    RBC 02/10/2025 4.61  4.14 - 5.80 10*6/mm3 Final    Hemoglobin 02/10/2025  15.2  13.0 - 17.7 g/dL Final    Hematocrit 02/10/2025 42.6  37.5 - 51.0 % Final    MCV 02/10/2025 92.4  79.0 - 97.0 fL Final    MCH 02/10/2025 33.0  26.6 - 33.0 pg Final    MCHC 02/10/2025 35.7  31.5 - 35.7 g/dL Final    RDW 02/10/2025 12.5  12.3 - 15.4 % Final    RDW-SD 02/10/2025 40.9  37.0 - 54.0 fl Final    MPV 02/10/2025 10.6  6.0 - 12.0 fL Final    Platelets 02/10/2025 206  140 - 450 10*3/mm3 Final    Neutrophil % 02/10/2025 59.3  42.7 - 76.0 % Final    Lymphocyte % 02/10/2025 28.0  19.6 - 45.3 % Final    Monocyte % 02/10/2025 7.6  5.0 - 12.0 % Final    Eosinophil % 02/10/2025 3.7  0.3 - 6.2 % Final    Basophil % 02/10/2025 1.0  0.0 - 1.5 % Final    Immature Grans % 02/10/2025 0.4  0.0 - 0.5 % Final    Neutrophils, Absolute 02/10/2025 4.16  1.70 - 7.00 10*3/mm3 Final    Lymphocytes, Absolute 02/10/2025 1.96  0.70 - 3.10 10*3/mm3 Final    Monocytes, Absolute 02/10/2025 0.53  0.10 - 0.90 10*3/mm3 Final    Eosinophils, Absolute 02/10/2025 0.26  0.00 - 0.40 10*3/mm3 Final    Basophils, Absolute 02/10/2025 0.07  0.00 - 0.20 10*3/mm3 Final    Immature Grans, Absolute 02/10/2025 0.03  0.00 - 0.05 10*3/mm3 Final    nRBC 02/10/2025 0.0  0.0 - 0.2 /100 WBC Final       Imaging  No Images in the past 120 days found..    Results  Laboratory Studies  Triglycerides are 262. HDL cholesterol is low. Vitamin D is low. PSA is normal.       Procedures       ASSESSMENT & PLAN  Diagnoses and all orders for this visit:    1. Mixed hyperlipidemia (Primary)  -     Vitamin D,25-Hydroxy; Future  -     TSH Rfx On Abnormal To Free T4; Future  -     Lipid Panel; Future  -     Comprehensive Metabolic Panel; Future  -     CBC & Differential; Future  -     CT Chest Low Dose Follow Up Without Contrast; Future    2. Encounter to establish care  -     Vitamin D,25-Hydroxy; Future  -     TSH Rfx On Abnormal To Free T4; Future  -     Lipid Panel; Future  -     Comprehensive Metabolic Panel; Future  -     CBC & Differential; Future  -     CT Chest  Low Dose Follow Up Without Contrast; Future    3. Vitamin D deficiency  -     Vitamin D,25-Hydroxy; Future  -     TSH Rfx On Abnormal To Free T4; Future  -     Lipid Panel; Future  -     Comprehensive Metabolic Panel; Future  -     CBC & Differential; Future  -     CT Chest Low Dose Follow Up Without Contrast; Future    4. Current every day smoker  -     Vitamin D,25-Hydroxy; Future  -     TSH Rfx On Abnormal To Free T4; Future  -     Lipid Panel; Future  -     Comprehensive Metabolic Panel; Future  -     CBC & Differential; Future  -     CT Chest Low Dose Follow Up Without Contrast; Future    5. Chronic obstructive pulmonary disease, unspecified COPD type    6. Wellness examination    Other orders  -     Cholecalciferol (Vitamin D3) 50 MCG (2000 UT) capsule; Take 1 capsule by mouth Daily. Take with a fatty meal.  Dispense: 90 capsule; Refill: 3         Assessment & Plan  1. Elevated triglycerides.  His triglyceride levels have decreased from over 900 to 262, which is still high but not alarming. His HDL cholesterol is low, while his LDL cholesterol is not significantly elevated. He is advised to reduce fat intake, increase fiber consumption, and limit simple sugars. He should avoid alcohol due to potential impact.  A repeat blood work will be scheduled in 6 months to monitor his triglyceride levels.    2. Low vitamin D.  His vitamin D levels are suboptimal. He is advised to start taking vitamin D supplements.    3. Health maintenance.  He is due for a low-dose CT scan, which will be scheduled for late afternoon to accommodate his work schedule.  He is encouraged not to smoke                  FOLLOW UP  Return in about 6 months (around 8/11/2025), or labs then visit.  Patient was given instructions and counseling regarding his condition or for health maintenance advice. Please see specific information pulled into the AVS if appropriate.     Patient or patient representative verbalized consent for the use of Ambient  Listening during the visit with  ZOIE Christianson for chart documentation. 2/12/2025  09:28 EST    ZOIE Christianson  02/12/25  09:28 EST

## 2025-02-27 ENCOUNTER — HOSPITAL ENCOUNTER (OUTPATIENT)
Dept: CT IMAGING | Facility: HOSPITAL | Age: 55
Discharge: HOME | End: 2025-02-27
Admitting: NURSE PRACTITIONER
Payer: COMMERCIAL

## 2025-02-27 DIAGNOSIS — E55.9 VITAMIN D DEFICIENCY: ICD-10-CM

## 2025-02-27 DIAGNOSIS — F17.200 CURRENT EVERY DAY SMOKER: ICD-10-CM

## 2025-02-27 DIAGNOSIS — Z76.89 ENCOUNTER TO ESTABLISH CARE: ICD-10-CM

## 2025-02-27 DIAGNOSIS — E78.2 MIXED HYPERLIPIDEMIA: ICD-10-CM

## 2025-02-27 PROCEDURE — 71250 CT THORAX DX C-: CPT

## 2025-04-08 ENCOUNTER — OFFICE VISIT (OUTPATIENT)
Age: 55
End: 2025-04-08
Payer: COMMERCIAL

## 2025-04-08 VITALS
HEART RATE: 84 BPM | SYSTOLIC BLOOD PRESSURE: 120 MMHG | HEIGHT: 71 IN | BODY MASS INDEX: 25.73 KG/M2 | WEIGHT: 183.8 LBS | DIASTOLIC BLOOD PRESSURE: 80 MMHG | OXYGEN SATURATION: 98 %

## 2025-04-08 DIAGNOSIS — F17.200 CURRENT EVERY DAY SMOKER: ICD-10-CM

## 2025-04-08 DIAGNOSIS — J44.9 CHRONIC OBSTRUCTIVE PULMONARY DISEASE, UNSPECIFIED COPD TYPE: ICD-10-CM

## 2025-04-08 DIAGNOSIS — E55.9 VITAMIN D DEFICIENCY: ICD-10-CM

## 2025-04-08 DIAGNOSIS — E78.2 MIXED HYPERLIPIDEMIA: ICD-10-CM

## 2025-04-08 DIAGNOSIS — R53.83 CAREGIVER WITH FATIGUE: Primary | ICD-10-CM

## 2025-04-08 PROCEDURE — 99213 OFFICE O/P EST LOW 20 MIN: CPT | Performed by: NURSE PRACTITIONER

## 2025-04-08 NOTE — PROGRESS NOTES
"Chief Complaint  Hyperlipidemia and Family Problem (Pt needs fmla papers filled out because his wife his depressed )    Subjective      Darrius Florentino is a 54 y.o. male who presents to [facility]     History of Present Illness  The patient presents for FMLA papers.  He is requesting to have FMLA for care of his wife.  His wife recently lost her father and is grieving.  She has increased anxiety and depression routinely but this is exacerbated it.  She also has a history of migraines and he feels like she is not doing well and would like the availability to be off with her during these episodes when she is having a hard time.  She also has a history of COPD and he reports during these episodes when she is depressed and crying she also has increased shortness of breath if he is there he is able to keep her calm her.  He also needs to be available to take her to her appointments as she is having a hard time going  a lone.  He works at a local factory with weekly overtime.  It is very hard for him to get off work.  If he has FMLA papers they allow him time to care for her.  Without question.    He himself has COPD and hyperlipidemia he has a follow-up appointment with myself in August.  He has been doing better with his cholesterol.  He continues to smoke encouraged to quit  SOCIAL HISTORY  She admits to smoking.         Objective   Vital Signs:   Vitals:    04/08/25 1431   BP: 120/80   Pulse: 84   SpO2: 98%   Weight: 83.4 kg (183 lb 12.8 oz)   Height: 180.3 cm (70.98\")     Body mass index is 25.65 kg/m².    Wt Readings from Last 3 Encounters:   04/08/25 83.4 kg (183 lb 12.8 oz)   02/11/25 83.6 kg (184 lb 6.4 oz)   01/23/25 82.6 kg (182 lb)     BP Readings from Last 3 Encounters:   04/08/25 120/80   02/11/25 118/62   01/23/25 138/93       Health Maintenance   Topic Date Due    Pneumococcal Vaccine 50+ (1 of 2 - PCV) Never done    TDAP/TD VACCINES (1 - Tdap) Never done    " ZOSTER VACCINE (1 of 2) Never done    COVID-19 Vaccine (1 - 2024-25 season) Never done    INFLUENZA VACCINE  07/01/2025    LIPID PANEL  02/10/2026    ANNUAL PHYSICAL  02/11/2026    LUNG CANCER SCREENING  02/27/2026    COLORECTAL CANCER SCREENING  04/21/2026    HEPATITIS C SCREENING  Completed       Past Medical History:   Diagnosis Date    Allergic     Seizures      Past Surgical History:   Procedure Laterality Date    HERNIA REPAIR       History reviewed. No pertinent family history.  Social History     Socioeconomic History    Marital status:    Tobacco Use    Smoking status: Every Day     Current packs/day: 1.00     Average packs/day: 1 pack/day for 39.1 years (39.1 ttl pk-yrs)     Types: Cigarettes     Start date: 3/11/1986     Passive exposure: Current    Smokeless tobacco: Never   Vaping Use    Vaping status: Never Used   Substance and Sexual Activity    Alcohol use: Never    Drug use: Never    Sexual activity: Defer       Current Outpatient Medications   Medication Instructions    Vitamin D3 2,000 Units, Oral, Daily, Take with a fatty meal.       There are no discontinued medications.     Physical Exam  Vitals and nursing note reviewed.   Constitutional:       Appearance: Normal appearance.   HENT:      Head: Normocephalic.   Eyes:      Extraocular Movements: Extraocular movements intact.      Pupils: Pupils are equal, round, and reactive to light.   Cardiovascular:      Rate and Rhythm: Normal rate and regular rhythm.      Pulses: Normal pulses.   Pulmonary:      Effort: Pulmonary effort is normal.      Breath sounds: Normal breath sounds.   Abdominal:      Palpations: Abdomen is soft.   Musculoskeletal:         General: Normal range of motion.      Cervical back: Normal range of motion.   Skin:     General: Skin is warm and dry.   Neurological:      General: No focal deficit present.      Mental Status: He is alert.   Psychiatric:         Mood and Affect: Mood normal.         Behavior: Behavior  normal.         Thought Content: Thought content normal.          Physical Exam         Result Review :  The following data was reviewed by: ZOIE Christianson on 04/08/2025:    Labs  Lab on 02/10/2025   Component Date Value Ref Range Status    25 Hydroxy, Vitamin D 02/10/2025 13.8 (L)  30.0 - 100.0 ng/ml Final    TSH 02/10/2025 1.290  0.270 - 4.200 uIU/mL Final    Total Cholesterol 02/10/2025 183  0 - 200 mg/dL Final    Triglycerides 02/10/2025 262 (H)  0 - 150 mg/dL Final    HDL Cholesterol 02/10/2025 34 (L)  40 - 60 mg/dL Final    LDL Cholesterol  02/10/2025 104 (H)  0 - 100 mg/dL Final    VLDL Cholesterol 02/10/2025 45 (H)  5 - 40 mg/dL Final    LDL/HDL Ratio 02/10/2025 2.84   Final    Glucose 02/10/2025 94  65 - 99 mg/dL Final    BUN 02/10/2025 7  6 - 20 mg/dL Final    Creatinine 02/10/2025 0.91  0.76 - 1.27 mg/dL Final    Sodium 02/10/2025 136  136 - 145 mmol/L Final    Potassium 02/10/2025 4.6  3.5 - 5.2 mmol/L Final    Chloride 02/10/2025 103  98 - 107 mmol/L Final    CO2 02/10/2025 27.0  22.0 - 29.0 mmol/L Final    Calcium 02/10/2025 9.5  8.6 - 10.5 mg/dL Final    Total Protein 02/10/2025 6.9  6.0 - 8.5 g/dL Final    Albumin 02/10/2025 4.3  3.5 - 5.2 g/dL Final    ALT (SGPT) 02/10/2025 25  1 - 41 U/L Final    AST (SGOT) 02/10/2025 27  1 - 40 U/L Final    Alkaline Phosphatase 02/10/2025 88  39 - 117 U/L Final    Total Bilirubin 02/10/2025 0.4  0.0 - 1.2 mg/dL Final    Globulin 02/10/2025 2.6  gm/dL Final    A/G Ratio 02/10/2025 1.7  g/dL Final    BUN/Creatinine Ratio 02/10/2025 7.7  7.0 - 25.0 Final    Anion Gap 02/10/2025 6.0  5.0 - 15.0 mmol/L Final    eGFR 02/10/2025 100.8  >60.0 mL/min/1.73 Final    Hepatitis C Ab 02/10/2025 Non-Reactive  Non-Reactive Final    Hemoglobin A1C 02/10/2025 5.20  4.80 - 5.60 % Final    PSA 02/10/2025 0.710  0.000 - 4.000 ng/mL Final    WBC 02/10/2025 7.01  3.40 - 10.80 10*3/mm3 Final    RBC 02/10/2025 4.61  4.14 - 5.80 10*6/mm3 Final    Hemoglobin 02/10/2025 15.2   13.0 - 17.7 g/dL Final    Hematocrit 02/10/2025 42.6  37.5 - 51.0 % Final    MCV 02/10/2025 92.4  79.0 - 97.0 fL Final    MCH 02/10/2025 33.0  26.6 - 33.0 pg Final    MCHC 02/10/2025 35.7  31.5 - 35.7 g/dL Final    RDW 02/10/2025 12.5  12.3 - 15.4 % Final    RDW-SD 02/10/2025 40.9  37.0 - 54.0 fl Final    MPV 02/10/2025 10.6  6.0 - 12.0 fL Final    Platelets 02/10/2025 206  140 - 450 10*3/mm3 Final    Neutrophil % 02/10/2025 59.3  42.7 - 76.0 % Final    Lymphocyte % 02/10/2025 28.0  19.6 - 45.3 % Final    Monocyte % 02/10/2025 7.6  5.0 - 12.0 % Final    Eosinophil % 02/10/2025 3.7  0.3 - 6.2 % Final    Basophil % 02/10/2025 1.0  0.0 - 1.5 % Final    Immature Grans % 02/10/2025 0.4  0.0 - 0.5 % Final    Neutrophils, Absolute 02/10/2025 4.16  1.70 - 7.00 10*3/mm3 Final    Lymphocytes, Absolute 02/10/2025 1.96  0.70 - 3.10 10*3/mm3 Final    Monocytes, Absolute 02/10/2025 0.53  0.10 - 0.90 10*3/mm3 Final    Eosinophils, Absolute 02/10/2025 0.26  0.00 - 0.40 10*3/mm3 Final    Basophils, Absolute 02/10/2025 0.07  0.00 - 0.20 10*3/mm3 Final    Immature Grans, Absolute 02/10/2025 0.03  0.00 - 0.05 10*3/mm3 Final    nRBC 02/10/2025 0.0  0.0 - 0.2 /100 WBC Final       Imaging  CT Chest Low Dose Follow Up Without Contrast  Result Date: 3/2/2025  Impression: Low-dose screening CT scan of the chest demonstrating no new or suspicious lung nodule. Low-dose screening CT scan of the chest is recommended in a year. Recommendation: Continue annual screening with LDCT Lung Rads Assessment: Lung-RADS L2 - Benign appearance or <1% chance of malignancy. Electronically Signed: Joe Rodarte MD  3/2/2025 6:14 PM EST  Workstation ID: [id number]      Results  Laboratory Studies  Triglycerides were over 900.       Procedures       ASSESSMENT & PLAN  Diagnoses and all orders for this visit:    1. Caregiver with fatigue (Primary)    2. Mixed hyperlipidemia    3. Vitamin D deficiency    4. Current every day smoker    5. Chronic obstructive  pulmonary disease, unspecified COPD type         Assessment & Plan  1. Anxiety.  Her anxiety is exacerbated by grief, leading to periodic flares. She is advised to continue engaging in activities that help manage her anxiety, such as spending time outdoors and staying busy. If symptoms persist or worsen, medication may be considered.    2. Depression.  Her depression is also exacerbated by grief. She is encouraged to maintain social interactions and engage in activities that provide relief. If her depression does not improve, starting an antidepressant may be considered.    3. Chronic obstructive pulmonary disease.  Her COPD is exacerbated by grief. She is advised to avoid smoking and continue any current COPD management plan. If her symptoms worsen, further treatment options will be explored.    4. Migraines.  Her migraines are exacerbated by grief. She is advised to manage stress and maintain a regular routine to help reduce migraine frequency. If migraines persist, medication adjustments may be necessary.    5. Cholesterol management.  Her cholesterol levels have improved since discontinuing certain vitamins. Blood work is scheduled for 05/2025 to monitor her cholesterol levels. She is advised to continue her current management plan and make lifestyle changes to further reduce cholesterol levels.    6. [facility] paperwork.  [facility] paperwork has been completed with a start date of 11/2024 and an end date of 04/01/2026. The paperwork includes provisions for anxiety, depression, migraines, and exacerbation of COPD caused by grief. It also notes the need for her  to assist with care, appointments, and therapy.                  FOLLOW UP  Return See in August as planned.  Patient was given instructions and counseling regarding his condition or for health maintenance advice. Please see specific information pulled into the AVS if appropriate.     Patient or patient representative verbalized consent for the use of  Ambient Listening during the visit with  ZOIE Christianson for chart documentation. 4/8/2025  15:33 EDT    ZOIE Christianson  04/08/25  15:32 EDT

## 2025-04-08 NOTE — PATIENT INSTRUCTIONS
FMLA paperwork filled out  Continue on high-fiber low-fat diet  Do not smoke try to quit  See in August as planned have blood work then visit

## 2025-06-24 ENCOUNTER — OFFICE VISIT (OUTPATIENT)
Age: 55
End: 2025-06-24
Payer: COMMERCIAL

## 2025-06-24 VITALS
HEART RATE: 83 BPM | OXYGEN SATURATION: 96 % | BODY MASS INDEX: 26.31 KG/M2 | HEIGHT: 71 IN | WEIGHT: 187.9 LBS | SYSTOLIC BLOOD PRESSURE: 140 MMHG | DIASTOLIC BLOOD PRESSURE: 74 MMHG

## 2025-06-24 DIAGNOSIS — L25.3 CONTACT DERMATITIS DUE TO OTHER CHEMICAL PRODUCT, UNSPECIFIED CONTACT DERMATITIS TYPE: Primary | ICD-10-CM

## 2025-06-24 PROCEDURE — 99214 OFFICE O/P EST MOD 30 MIN: CPT | Performed by: NURSE PRACTITIONER

## 2025-06-24 PROCEDURE — 96372 THER/PROPH/DIAG INJ SC/IM: CPT | Performed by: NURSE PRACTITIONER

## 2025-06-24 RX ORDER — CEPHALEXIN 500 MG/1
500 CAPSULE ORAL 2 TIMES DAILY
Qty: 14 CAPSULE | Refills: 0 | Status: SHIPPED | OUTPATIENT
Start: 2025-06-24 | End: 2025-07-01

## 2025-06-24 RX ORDER — METHYLPREDNISOLONE ACETATE 40 MG/ML
80 INJECTION, SUSPENSION INTRA-ARTICULAR; INTRALESIONAL; INTRAMUSCULAR; SOFT TISSUE ONCE
Status: COMPLETED | OUTPATIENT
Start: 2025-06-24 | End: 2025-06-24

## 2025-06-24 RX ORDER — PREDNISONE 10 MG/1
10 TABLET ORAL 2 TIMES DAILY
Qty: 20 TABLET | Refills: 0 | Status: SHIPPED | OUTPATIENT
Start: 2025-06-24

## 2025-06-24 RX ADMIN — METHYLPREDNISOLONE ACETATE 80 MG: 40 INJECTION, SUSPENSION INTRA-ARTICULAR; INTRALESIONAL; INTRAMUSCULAR; SOFT TISSUE at 11:30

## 2025-06-24 NOTE — PATIENT INSTRUCTIONS
Take medication as instructed   watch for secondary infection  Avoid chemical as instructed  Don't pick at areas  Cool compresses  Dial soap  Work note provided

## 2025-06-24 NOTE — PROGRESS NOTES
"Chief Complaint  Rash (Pt is here for a rash that he got from work. Needs work note for the rest of the week.)    Subjective      Darrius Florentino is a 54 y.o. male who presents to [facility]     History of Present Illness  The patient presents for evaluation of a rash believed to be due to a chemical he is exposed to at work.    He reports that for the last 4 weeks he has been dealing with a rash that is very itchy to bilateral upper extremities into both his hands then turns to sores., which has been persisting for over a month.  He has contacted his  at work as well as the nurse.  He was informed to see his primary care.  The rash extends from his fingers to his arms, causing sores that occasionally drain. He reports that the condition improves when he soaks his arms in Epsom salt, which also alleviates the itching but quickly returned as he is exposed to the liquid every day.  He does wear PPE however he reports that it is not waterproof and the chemical is allowed to leak into his gloves.  He has been employed at this company for 16 years and has never experienced any issues with the lubricant until now. The concentration of the lubricant has been altered recently, leading to daily exposure and worsening of his condition. He has noticed scarring and describes the sensation very itchy as if worms are crawling out of his arms. The rash has been intermittent since 05/30/2025.  A nurse at his workplace provided him with a hand cream and suggested finding alternative employment if it proved ineffective.  The cream was not very effective.  Rashes no other location other than upper extremities primarily forearm and his hands     Thinks-causative agent is  related to eco draw eg 40      Objective   Vital Signs:   Vitals:    06/24/25 0935   BP: 140/74   Pulse: 83   SpO2: 96%   Weight: 85.2 kg (187 lb 14.4 oz)   Height: 180.3 cm (70.98\")     Body mass index is " 26.22 kg/m².    Wt Readings from Last 3 Encounters:   06/24/25 85.2 kg (187 lb 14.4 oz)   04/08/25 83.4 kg (183 lb 12.8 oz)   02/11/25 83.6 kg (184 lb 6.4 oz)     BP Readings from Last 3 Encounters:   06/24/25 140/74   04/08/25 120/80   02/11/25 118/62       Health Maintenance   Topic Date Due    Pneumococcal Vaccine 50+ (1 of 2 - PCV) Never done    TDAP/TD VACCINES (1 - Tdap) Never done    ZOSTER VACCINE (1 of 2) 12/21/2025 (Originally 9/5/2021)    COVID-19 Vaccine (1 - 2024-25 season) 12/24/2025 (Originally 9/1/2024)    INFLUENZA VACCINE  07/01/2025    LIPID PANEL  02/10/2026    ANNUAL PHYSICAL  02/11/2026    LUNG CANCER SCREENING  02/27/2026    COLORECTAL CANCER SCREENING  04/21/2026    HEPATITIS C SCREENING  Completed       Past Medical History:   Diagnosis Date    Allergic     Seizures      Past Surgical History:   Procedure Laterality Date    HERNIA REPAIR       History reviewed. No pertinent family history.  Social History     Socioeconomic History    Marital status:    Tobacco Use    Smoking status: Every Day     Current packs/day: 1.00     Average packs/day: 1 pack/day for 39.3 years (39.3 ttl pk-yrs)     Types: Cigarettes     Start date: 3/11/1986     Passive exposure: Current    Smokeless tobacco: Never   Vaping Use    Vaping status: Never Used   Substance and Sexual Activity    Alcohol use: Never    Drug use: Never    Sexual activity: Defer       Current Outpatient Medications   Medication Instructions    cephalexin (KEFLEX) 500 mg, Oral, 2 Times Daily    predniSONE (DELTASONE) 10 mg, Oral, 2 Times Daily    Vitamin D3 2,000 Units, Oral, Daily, Take with a fatty meal.       There are no discontinued medications.     Physical Exam  Vitals and nursing note reviewed.   Constitutional:       Appearance: Normal appearance.   HENT:      Head: Normocephalic.   Eyes:      Extraocular Movements: Extraocular movements intact.      Pupils: Pupils are equal, round, and reactive to light.   Cardiovascular:       Rate and Rhythm: Normal rate and regular rhythm.      Pulses: Normal pulses.   Pulmonary:      Effort: Pulmonary effort is normal.      Breath sounds: Normal breath sounds.   Abdominal:      Palpations: Abdomen is soft.   Musculoskeletal:         General: Normal range of motion.      Cervical back: Normal range of motion.   Skin:     General: Skin is warm and dry.      Comments: Multiple hard nodules with scabed areas to upper ext , firm and erythematous isolated lesions with mild erythema patches   Neurological:      General: No focal deficit present.      Mental Status: He is alert.   Psychiatric:         Mood and Affect: Mood normal.         Behavior: Behavior normal.         Thought Content: Thought content normal.          Physical Exam         Result Review :  The following data was reviewed by: ZOIE Christianson on 06/24/2025:    Labs  No visits with results within 2 Month(s) from this visit.   Latest known visit with results is:   Lab on 02/10/2025   Component Date Value Ref Range Status    25 Hydroxy, Vitamin D 02/10/2025 13.8 (L)  30.0 - 100.0 ng/ml Final    TSH 02/10/2025 1.290  0.270 - 4.200 uIU/mL Final    Total Cholesterol 02/10/2025 183  0 - 200 mg/dL Final    Triglycerides 02/10/2025 262 (H)  0 - 150 mg/dL Final    HDL Cholesterol 02/10/2025 34 (L)  40 - 60 mg/dL Final    LDL Cholesterol  02/10/2025 104 (H)  0 - 100 mg/dL Final    VLDL Cholesterol 02/10/2025 45 (H)  5 - 40 mg/dL Final    LDL/HDL Ratio 02/10/2025 2.84   Final    Glucose 02/10/2025 94  65 - 99 mg/dL Final    BUN 02/10/2025 7  6 - 20 mg/dL Final    Creatinine 02/10/2025 0.91  0.76 - 1.27 mg/dL Final    Sodium 02/10/2025 136  136 - 145 mmol/L Final    Potassium 02/10/2025 4.6  3.5 - 5.2 mmol/L Final    Chloride 02/10/2025 103  98 - 107 mmol/L Final    CO2 02/10/2025 27.0  22.0 - 29.0 mmol/L Final    Calcium 02/10/2025 9.5  8.6 - 10.5 mg/dL Final    Total Protein 02/10/2025 6.9  6.0 - 8.5 g/dL Final    Albumin 02/10/2025 4.3  3.5  - 5.2 g/dL Final    ALT (SGPT) 02/10/2025 25  1 - 41 U/L Final    AST (SGOT) 02/10/2025 27  1 - 40 U/L Final    Alkaline Phosphatase 02/10/2025 88  39 - 117 U/L Final    Total Bilirubin 02/10/2025 0.4  0.0 - 1.2 mg/dL Final    Globulin 02/10/2025 2.6  gm/dL Final    A/G Ratio 02/10/2025 1.7  g/dL Final    BUN/Creatinine Ratio 02/10/2025 7.7  7.0 - 25.0 Final    Anion Gap 02/10/2025 6.0  5.0 - 15.0 mmol/L Final    eGFR 02/10/2025 100.8  >60.0 mL/min/1.73 Final    Hepatitis C Ab 02/10/2025 Non-Reactive  Non-Reactive Final    Hemoglobin A1C 02/10/2025 5.20  4.80 - 5.60 % Final    PSA 02/10/2025 0.710  0.000 - 4.000 ng/mL Final    WBC 02/10/2025 7.01  3.40 - 10.80 10*3/mm3 Final    RBC 02/10/2025 4.61  4.14 - 5.80 10*6/mm3 Final    Hemoglobin 02/10/2025 15.2  13.0 - 17.7 g/dL Final    Hematocrit 02/10/2025 42.6  37.5 - 51.0 % Final    MCV 02/10/2025 92.4  79.0 - 97.0 fL Final    MCH 02/10/2025 33.0  26.6 - 33.0 pg Final    MCHC 02/10/2025 35.7  31.5 - 35.7 g/dL Final    RDW 02/10/2025 12.5  12.3 - 15.4 % Final    RDW-SD 02/10/2025 40.9  37.0 - 54.0 fl Final    MPV 02/10/2025 10.6  6.0 - 12.0 fL Final    Platelets 02/10/2025 206  140 - 450 10*3/mm3 Final    Neutrophil % 02/10/2025 59.3  42.7 - 76.0 % Final    Lymphocyte % 02/10/2025 28.0  19.6 - 45.3 % Final    Monocyte % 02/10/2025 7.6  5.0 - 12.0 % Final    Eosinophil % 02/10/2025 3.7  0.3 - 6.2 % Final    Basophil % 02/10/2025 1.0  0.0 - 1.5 % Final    Immature Grans % 02/10/2025 0.4  0.0 - 0.5 % Final    Neutrophils, Absolute 02/10/2025 4.16  1.70 - 7.00 10*3/mm3 Final    Lymphocytes, Absolute 02/10/2025 1.96  0.70 - 3.10 10*3/mm3 Final    Monocytes, Absolute 02/10/2025 0.53  0.10 - 0.90 10*3/mm3 Final    Eosinophils, Absolute 02/10/2025 0.26  0.00 - 0.40 10*3/mm3 Final    Basophils, Absolute 02/10/2025 0.07  0.00 - 0.20 10*3/mm3 Final    Immature Grans, Absolute 02/10/2025 0.03  0.00 - 0.05 10*3/mm3 Final    nRBC 02/10/2025 0.0  0.0 - 0.2 /100 WBC Final        Imaging  CT Chest Low Dose Follow Up Without Contrast  Result Date: 3/2/2025  Impression: Low-dose screening CT scan of the chest demonstrating no new or suspicious lung nodule. Low-dose screening CT scan of the chest is recommended in a year. Recommendation: Continue annual screening with LDCT Lung Rads Assessment: Lung-RADS L2 - Benign appearance or <1% chance of malignancy. Electronically Signed: Joe Rodarte MD  3/2/2025 6:14 PM EST  Workstation ID: [id number]      Results         Procedures       ASSESSMENT & PLAN  Diagnoses and all orders for this visit:    1. Contact dermatitis due to other chemical product, unspecified contact dermatitis type (Primary)  -     predniSONE (DELTASONE) 10 MG tablet; Take 1 tablet by mouth 2 (Two) Times a Day.  Dispense: 20 tablet; Refill: 0  -     cephalexin (KEFLEX) 500 MG capsule; Take 1 capsule by mouth 2 (Two) Times a Day for 7 days.  Dispense: 14 capsule; Refill: 0  -     methylPREDNISolone acetate (DEPO-medrol) injection 80 mg         Assessment & Plan  1.  Contact dermatitis  - The condition appears to have a secondary infection, scabbed multiple lesions firm nodules.  Mild erythema.  The patient reports  has been present for over a month and is associated with itching and sores that occasionally drain.  - Advised to avoid scratching or picking at the affected areas and to apply cool compresses for relief. The use of Epsom salt should be reduced, and lotions and perfumes should be avoided. The affected areas should be washed with Dial soap, and if this causes excessive dryness, Ivory or Dove soap without coloring can be used.  - A note will be provided for him to take a week off work.  To allow area to heal and avoid reexposure.  Discussed the potential need for a dermatology referral if the condition recurs.  - A prescription for prednisone 10 mg, to be taken twice daily, will be provided. Additionally, an antibiotic will be prescribed for a duration of 7 days. A  steroid injection will also be administered during this visit.    PROCEDURE  Procedure: Steroid Injection (left arm)    All questions were answered and agreement to proceed was given after the following Pre-Procedure details were reviewed:  - Risks and Benefits: Potential for infection, relief of inflammation and itching  - Alternative Options: Oral steroids, topical treatments  - Side effects: Possible pain at injection site, temporary increase in blood sugar levels  - Consent: Verbal consent obtained    Intra-Procedure:  - Time-Out: Confirmed patient's identity, procedure, and site  - Site Preparation: Cleaned with antiseptic solution  - Medication: Steroid injection administered  - Hemostasis: Applied pressure to injection site  - Dressing: Bandage applied to injection site    Post-Procedure:  - Tolerance Level: Tolerated well  - Home Care Instructions: Keep the area clean and dry, monitor for signs of infection, avoid scratching                  FOLLOW UP  Return in about 1 week (around 7/1/2025).  Patient was given instructions and counseling regarding his condition or for health maintenance advice. Please see specific information pulled into the AVS if appropriate.     Patient or patient representative verbalized consent for the use of Ambient Listening during the visit with  ZOIE Christianson for chart documentation. 6/24/2025  10:14 EDT    ZOIE Christianson  06/24/25  10:14 EDT

## 2025-07-15 ENCOUNTER — OFFICE VISIT (OUTPATIENT)
Age: 55
End: 2025-07-15
Payer: COMMERCIAL

## 2025-07-15 VITALS
HEART RATE: 79 BPM | SYSTOLIC BLOOD PRESSURE: 132 MMHG | WEIGHT: 185.3 LBS | HEIGHT: 71 IN | BODY MASS INDEX: 25.94 KG/M2 | DIASTOLIC BLOOD PRESSURE: 80 MMHG | TEMPERATURE: 98 F | OXYGEN SATURATION: 93 %

## 2025-07-15 DIAGNOSIS — L25.3 CONTACT DERMATITIS DUE TO OTHER CHEMICAL PRODUCT, UNSPECIFIED CONTACT DERMATITIS TYPE: Primary | ICD-10-CM

## 2025-07-15 PROCEDURE — 99214 OFFICE O/P EST MOD 30 MIN: CPT | Performed by: NURSE PRACTITIONER

## 2025-07-15 RX ORDER — BETAMETHASONE DIPROPIONATE 0.5 MG/G
1 CREAM TOPICAL 2 TIMES DAILY
Qty: 45 G | Refills: 0 | Status: SHIPPED | OUTPATIENT
Start: 2025-07-15 | End: 2025-07-16 | Stop reason: SDUPTHER

## 2025-07-15 RX ORDER — MUPIROCIN 2 %
1 OINTMENT (GRAM) TOPICAL 2 TIMES DAILY
Qty: 30 G | Refills: 0 | Status: SHIPPED | OUTPATIENT
Start: 2025-07-15 | End: 2025-07-25

## 2025-07-15 NOTE — PROGRESS NOTES
Chief Complaint  Primary Care Follow-Up (Pt is here for a follow up on rash, states it did get better but it now worse and has started to spread. Pt states it is work related due to lube used at his job. )    Subjective      Darrius Florentino is a 54 y.o. male who presents to [facility]     History of Present Illness  The patient presents for fu of arm rash.    He reports that his arm condition was stable and almost completely clear a week ago.  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  He reports all the lesions and rash had disappeared but he had not been exposed to the chemical.  He has returned to work this week, and it has since worsened upon returning .  He also reports the rash has now spread to his face.  The chemical that he feels has caused the issue is all over the device that he works on and drips onto his face.  He reports he has to climb into the machine and very closed tight quarters and the chemical is all inside there.  He describes his work environment as cramped, likening it to crawling under a cabinet, where substances can drip onto his face, hair, and neck.  He feels like it is all related to the lubricant within this environment.  He did notify his supervisor and his company today and told them he would be filing a Workmen's Comp. claim.  Have explained to him that typically if it is a Workmen's Comp. issue they have the providers they want him to see however he states they did not advise him to do so but he will let them know he came to see me today.     His company has suggested the use of a body armor lotion that forms a protective layer on the skin during work, which can be peeled off afterward. He is uncertain about the effectiveness of this solution as it has not been provided as of yet.        He has escalated the issue within his company and filed an accident  "report. He has consulted with a nurse at his workplace twice, who advised him to either adapt to the situation or seek employment elsewhere.  He has not yet been referred to a workers' compensation doctor.     He has been with the company for 16 years and has never experienced any issues with the lubricant before.  He believes that the chemical he is concerned about has been recently changed.  He is seeking a solution to his condition as he does not want to continue working without pay while dealing with the skin discomfort caused by the lubricant.    He reports good relief from the rash with the steroids and being away from work.  However he does not like the way the steroids make him feel.    Social History:  Occupations:      Review of Systems  Constitutional:  Negative for activity change, fatigue and fever.  HENT:  Negative for congestion, rhinorrhea and sore throat.    Eyes:  Negative for discharge and redness.  Respiratory:  Negative for cough. Negative for shortness of breath, wheezing and stridor.    Cardiovascular:  Negative for chest pain.  Gastrointestinal:  Negative for abdominal pain.  Genitourinary:  Negative for dysuria.  Musculoskeletal:  Negative for arthralgias.  Skin: Positive for rash and wounds to arms  Neurological:  Negative for weakness and headaches.  Hematological:  Negative for adenopathy.    Objective   Vital Signs:   Vitals:    07/15/25 1456   BP: 132/80   BP Location: Left arm   Patient Position: Sitting   Cuff Size: Adult   Pulse: 79   Temp: 98 °F (36.7 °C)   TempSrc: Oral   SpO2: 93%   Weight: 84.1 kg (185 lb 4.8 oz)   Height: 180.3 cm (70.98\")     Body mass index is 25.86 kg/m².    Wt Readings from Last 3 Encounters:   07/15/25 84.1 kg (185 lb 4.8 oz)   06/24/25 85.2 kg (187 lb 14.4 oz)   04/08/25 83.4 kg (183 lb 12.8 oz)     BP Readings from Last 3 Encounters:   07/15/25 132/80   06/24/25 140/74   04/08/25 120/80       Health Maintenance   Topic Date Due    " Pneumococcal Vaccine 50+ (1 of 2 - PCV) Never done    TDAP/TD VACCINES (1 - Tdap) Never done    ZOSTER VACCINE (1 of 2) 12/21/2025 (Originally 9/5/2021)    COVID-19 Vaccine (1 - 2024-25 season) 12/24/2025 (Originally 9/1/2024)    INFLUENZA VACCINE  10/01/2025    LIPID PANEL  02/10/2026    ANNUAL PHYSICAL  02/11/2026    LUNG CANCER SCREENING  02/27/2026    COLORECTAL CANCER SCREENING  04/21/2026    HEPATITIS C SCREENING  Completed       Past Medical History:   Diagnosis Date    Allergic     Seizures      Past Surgical History:   Procedure Laterality Date    HERNIA REPAIR       History reviewed. No pertinent family history.  Social History     Socioeconomic History    Marital status:    Tobacco Use    Smoking status: Every Day     Current packs/day: 1.00     Average packs/day: 1 pack/day for 39.3 years (39.3 ttl pk-yrs)     Types: Cigarettes     Start date: 3/11/1986     Passive exposure: Current    Smokeless tobacco: Never   Vaping Use    Vaping status: Never Used   Substance and Sexual Activity    Alcohol use: Never    Drug use: Never    Sexual activity: Defer       Current Outpatient Medications   Medication Instructions    betamethasone dipropionate (DIPROSONE) 0.05 % cream 1 Application, Topical, 2 Times Daily    mupirocin (BACTROBAN) 2 % ointment 1 Application, Topical, 2 Times Daily, Apply to affected area.       Medications Discontinued During This Encounter   Medication Reason    predniSONE (DELTASONE) 10 MG tablet Historical Med - Therapy completed    Cholecalciferol (Vitamin D3) 50 MCG (2000 UT) capsule Historical Med - Therapy completed        Physical Exam  Vitals and nursing note reviewed.   Constitutional:       Appearance: Normal appearance.   HENT:      Head: Normocephalic.   Eyes:      Extraocular Movements: Extraocular movements intact.      Pupils: Pupils are equal, round, and reactive to light.   Cardiovascular:      Rate and Rhythm: Normal rate and regular rhythm.      Pulses: Normal  pulses.   Pulmonary:      Effort: Pulmonary effort is normal.      Breath sounds: Normal breath sounds.   Abdominal:      Palpations: Abdomen is soft.   Musculoskeletal:         General: Normal range of motion.      Cervical back: Normal range of motion.   Skin:     General: Skin is warm and dry.      Comments: Multiple macular papular raised red areas along his arms as well as now on the left side of his face.  Areas are scabbed skin texture very rough.  No signs and symptoms of vesicles or open wounds.   Neurological:      General: No focal deficit present.      Mental Status: He is alert.   Psychiatric:         Mood and Affect: Mood normal.         Behavior: Behavior normal.         Thought Content: Thought content normal.          Physical Exam  Skin: Rash noted on arms and face.       Result Review :  The following data was reviewed by: ZOIE Christianson on 07/15/2025:    Labs  No visits with results within 2 Month(s) from this visit.   Latest known visit with results is:   Lab on 02/10/2025   Component Date Value Ref Range Status    25 Hydroxy, Vitamin D 02/10/2025 13.8 (L)  30.0 - 100.0 ng/ml Final    TSH 02/10/2025 1.290  0.270 - 4.200 uIU/mL Final    Total Cholesterol 02/10/2025 183  0 - 200 mg/dL Final    Triglycerides 02/10/2025 262 (H)  0 - 150 mg/dL Final    HDL Cholesterol 02/10/2025 34 (L)  40 - 60 mg/dL Final    LDL Cholesterol  02/10/2025 104 (H)  0 - 100 mg/dL Final    VLDL Cholesterol 02/10/2025 45 (H)  5 - 40 mg/dL Final    LDL/HDL Ratio 02/10/2025 2.84   Final    Glucose 02/10/2025 94  65 - 99 mg/dL Final    BUN 02/10/2025 7  6 - 20 mg/dL Final    Creatinine 02/10/2025 0.91  0.76 - 1.27 mg/dL Final    Sodium 02/10/2025 136  136 - 145 mmol/L Final    Potassium 02/10/2025 4.6  3.5 - 5.2 mmol/L Final    Chloride 02/10/2025 103  98 - 107 mmol/L Final    CO2 02/10/2025 27.0  22.0 - 29.0 mmol/L Final    Calcium 02/10/2025 9.5  8.6 - 10.5 mg/dL Final    Total Protein 02/10/2025 6.9  6.0 - 8.5  g/dL Final    Albumin 02/10/2025 4.3  3.5 - 5.2 g/dL Final    ALT (SGPT) 02/10/2025 25  1 - 41 U/L Final    AST (SGOT) 02/10/2025 27  1 - 40 U/L Final    Alkaline Phosphatase 02/10/2025 88  39 - 117 U/L Final    Total Bilirubin 02/10/2025 0.4  0.0 - 1.2 mg/dL Final    Globulin 02/10/2025 2.6  gm/dL Final    A/G Ratio 02/10/2025 1.7  g/dL Final    BUN/Creatinine Ratio 02/10/2025 7.7  7.0 - 25.0 Final    Anion Gap 02/10/2025 6.0  5.0 - 15.0 mmol/L Final    eGFR 02/10/2025 100.8  >60.0 mL/min/1.73 Final    Hepatitis C Ab 02/10/2025 Non-Reactive  Non-Reactive Final    Hemoglobin A1C 02/10/2025 5.20  4.80 - 5.60 % Final    PSA 02/10/2025 0.710  0.000 - 4.000 ng/mL Final    WBC 02/10/2025 7.01  3.40 - 10.80 10*3/mm3 Final    RBC 02/10/2025 4.61  4.14 - 5.80 10*6/mm3 Final    Hemoglobin 02/10/2025 15.2  13.0 - 17.7 g/dL Final    Hematocrit 02/10/2025 42.6  37.5 - 51.0 % Final    MCV 02/10/2025 92.4  79.0 - 97.0 fL Final    MCH 02/10/2025 33.0  26.6 - 33.0 pg Final    MCHC 02/10/2025 35.7  31.5 - 35.7 g/dL Final    RDW 02/10/2025 12.5  12.3 - 15.4 % Final    RDW-SD 02/10/2025 40.9  37.0 - 54.0 fl Final    MPV 02/10/2025 10.6  6.0 - 12.0 fL Final    Platelets 02/10/2025 206  140 - 450 10*3/mm3 Final    Neutrophil % 02/10/2025 59.3  42.7 - 76.0 % Final    Lymphocyte % 02/10/2025 28.0  19.6 - 45.3 % Final    Monocyte % 02/10/2025 7.6  5.0 - 12.0 % Final    Eosinophil % 02/10/2025 3.7  0.3 - 6.2 % Final    Basophil % 02/10/2025 1.0  0.0 - 1.5 % Final    Immature Grans % 02/10/2025 0.4  0.0 - 0.5 % Final    Neutrophils, Absolute 02/10/2025 4.16  1.70 - 7.00 10*3/mm3 Final    Lymphocytes, Absolute 02/10/2025 1.96  0.70 - 3.10 10*3/mm3 Final    Monocytes, Absolute 02/10/2025 0.53  0.10 - 0.90 10*3/mm3 Final    Eosinophils, Absolute 02/10/2025 0.26  0.00 - 0.40 10*3/mm3 Final    Basophils, Absolute 02/10/2025 0.07  0.00 - 0.20 10*3/mm3 Final    Immature Grans, Absolute 02/10/2025 0.03  0.00 - 0.05 10*3/mm3 Final    nRBC 02/10/2025  0.0  0.0 - 0.2 /100 WBC Final       Imaging  No Images in the past 120 days found..    Results         Procedures       ASSESSMENT & PLAN  Diagnoses and all orders for this visit:    1. Contact dermatitis due to other chemical product, unspecified contact dermatitis type (Primary)  -     Ambulatory Referral to Dermatology    Other orders  -     betamethasone dipropionate (DIPROSONE) 0.05 % cream; Apply 1 Application topically to the appropriate area as directed 2 (Two) Times a Day.  Dispense: 45 g; Refill: 0  -     mupirocin (BACTROBAN) 2 % ointment; Apply 1 Application topically to the appropriate area as directed 2 (Two) Times a Day for 10 days. Apply to affected area.  Dispense: 30 g; Refill: 0         Assessment & Plan  1.  Contact dermatitis  - Reports that his arms were 100% healed after a two-week break from work, but the rash returned upon resuming work. The rash has also spread to his face.  - Exposure to a lubricant at work that drips on his face, hair, and neck.  - Referral to dermatology has been made for further evaluation. Advised to discuss with his employer about potential avoidance of the lubricant and to inquire if a consultation with a workers' compensation doctor is necessary.  - Two different creams prescribed: one to be applied sparingly once a day on the affected areas, and the other at least once a day on  area. Cautioned  increased sun sensitivity.  - Note provided to take the rest of the week off work to allow time away from the lubricant but instructed needed to follow-up with Workmen's Comp.                  FOLLOW UP  No follow-ups on file.  Patient was given instructions and counseling regarding his condition or for health maintenance advice. Please see specific information pulled into the AVS if appropriate.     Patient or patient representative verbalized consent for the use of Ambient Listening during the visit with  ZOIE Christianson for chart documentation. 7/16/2025   07:23 EDT    Chrissy Aragon, APRN  07/16/25  07:25 EDT

## 2025-07-15 NOTE — PATIENT INSTRUCTIONS
Speak with work about possible avoidance of the lubricant and next steps in dealing with Workmen's Comp.  See dermatology,   Off to avoid chemical until work advises next steps.   Off till Monday   Monitor for signs and symptoms of infection such as increased redness warmth swelling drainage  Use creams as discussed  Avoid sun

## 2025-07-16 RX ORDER — BETAMETHASONE DIPROPIONATE 0.5 MG/G
1 CREAM TOPICAL 2 TIMES DAILY
Qty: 45 G | Refills: 0 | Status: SHIPPED | OUTPATIENT
Start: 2025-07-16

## 2025-08-11 ENCOUNTER — LAB (OUTPATIENT)
Dept: LAB | Facility: HOSPITAL | Age: 55
End: 2025-08-11
Payer: COMMERCIAL

## 2025-08-11 DIAGNOSIS — Z76.89 ENCOUNTER TO ESTABLISH CARE: ICD-10-CM

## 2025-08-11 DIAGNOSIS — F17.200 CURRENT EVERY DAY SMOKER: ICD-10-CM

## 2025-08-11 DIAGNOSIS — E55.9 VITAMIN D DEFICIENCY: ICD-10-CM

## 2025-08-11 DIAGNOSIS — E78.2 MIXED HYPERLIPIDEMIA: ICD-10-CM

## 2025-08-11 LAB
25(OH)D3 SERPL-MCNC: 16.9 NG/ML (ref 30–100)
ALBUMIN SERPL-MCNC: 4.5 G/DL (ref 3.5–5.2)
ALBUMIN/GLOB SERPL: 1.6 G/DL
ALP SERPL-CCNC: 93 U/L (ref 39–117)
ALT SERPL W P-5'-P-CCNC: 25 U/L (ref 1–41)
ANION GAP SERPL CALCULATED.3IONS-SCNC: 12.2 MMOL/L (ref 5–15)
AST SERPL-CCNC: 26 U/L (ref 1–40)
BASOPHILS # BLD AUTO: 0.09 10*3/MM3 (ref 0–0.2)
BASOPHILS NFR BLD AUTO: 1.2 % (ref 0–1.5)
BILIRUB SERPL-MCNC: 0.4 MG/DL (ref 0–1.2)
BUN SERPL-MCNC: 12 MG/DL (ref 6–20)
BUN/CREAT SERPL: 11.2 (ref 7–25)
CALCIUM SPEC-SCNC: 9.4 MG/DL (ref 8.6–10.5)
CHLORIDE SERPL-SCNC: 103 MMOL/L (ref 98–107)
CHOLEST SERPL-MCNC: 215 MG/DL (ref 0–200)
CO2 SERPL-SCNC: 22.8 MMOL/L (ref 22–29)
CREAT SERPL-MCNC: 1.07 MG/DL (ref 0.76–1.27)
DEPRECATED RDW RBC AUTO: 43.5 FL (ref 37–54)
EGFRCR SERPLBLD CKD-EPI 2021: 83 ML/MIN/1.73
EOSINOPHIL # BLD AUTO: 0.35 10*3/MM3 (ref 0–0.4)
EOSINOPHIL NFR BLD AUTO: 4.8 % (ref 0.3–6.2)
ERYTHROCYTE [DISTWIDTH] IN BLOOD BY AUTOMATED COUNT: 13 % (ref 12.3–15.4)
GLOBULIN UR ELPH-MCNC: 2.8 GM/DL
GLUCOSE SERPL-MCNC: 99 MG/DL (ref 65–99)
HCT VFR BLD AUTO: 43.9 % (ref 37.5–51)
HDLC SERPL-MCNC: 24 MG/DL (ref 40–60)
HGB BLD-MCNC: 15.9 G/DL (ref 13–17.7)
IMM GRANULOCYTES # BLD AUTO: 0.03 10*3/MM3 (ref 0–0.05)
IMM GRANULOCYTES NFR BLD AUTO: 0.4 % (ref 0–0.5)
LDLC SERPL CALC-MCNC: 92 MG/DL (ref 0–100)
LDLC/HDLC SERPL: 3 {RATIO}
LYMPHOCYTES # BLD AUTO: 2.07 10*3/MM3 (ref 0.7–3.1)
LYMPHOCYTES NFR BLD AUTO: 28.7 % (ref 19.6–45.3)
MCH RBC QN AUTO: 33.7 PG (ref 26.6–33)
MCHC RBC AUTO-ENTMCNC: 36.2 G/DL (ref 31.5–35.7)
MCV RBC AUTO: 93 FL (ref 79–97)
MONOCYTES # BLD AUTO: 0.57 10*3/MM3 (ref 0.1–0.9)
MONOCYTES NFR BLD AUTO: 7.9 % (ref 5–12)
NEUTROPHILS NFR BLD AUTO: 4.11 10*3/MM3 (ref 1.7–7)
NEUTROPHILS NFR BLD AUTO: 57 % (ref 42.7–76)
NRBC BLD AUTO-RTO: 0 /100 WBC (ref 0–0.2)
PLATELET # BLD AUTO: 171 10*3/MM3 (ref 140–450)
PMV BLD AUTO: 11.4 FL (ref 6–12)
POTASSIUM SERPL-SCNC: 4.6 MMOL/L (ref 3.5–5.2)
PROT SERPL-MCNC: 7.3 G/DL (ref 6–8.5)
RBC # BLD AUTO: 4.72 10*6/MM3 (ref 4.14–5.8)
SODIUM SERPL-SCNC: 138 MMOL/L (ref 136–145)
TRIGL SERPL-MCNC: 595 MG/DL (ref 0–150)
TSH SERPL DL<=0.05 MIU/L-ACNC: 1.95 UIU/ML (ref 0.27–4.2)
VLDLC SERPL-MCNC: 99 MG/DL (ref 5–40)
WBC NRBC COR # BLD AUTO: 7.22 10*3/MM3 (ref 3.4–10.8)

## 2025-08-11 PROCEDURE — 36415 COLL VENOUS BLD VENIPUNCTURE: CPT

## 2025-08-11 PROCEDURE — 80061 LIPID PANEL: CPT

## 2025-08-11 PROCEDURE — 82306 VITAMIN D 25 HYDROXY: CPT

## 2025-08-11 PROCEDURE — 80050 GENERAL HEALTH PANEL: CPT

## 2025-08-12 ENCOUNTER — OFFICE VISIT (OUTPATIENT)
Age: 55
End: 2025-08-12
Payer: COMMERCIAL

## 2025-08-12 VITALS
SYSTOLIC BLOOD PRESSURE: 110 MMHG | DIASTOLIC BLOOD PRESSURE: 78 MMHG | OXYGEN SATURATION: 96 % | BODY MASS INDEX: 25.87 KG/M2 | HEART RATE: 79 BPM | HEIGHT: 71 IN | WEIGHT: 184.8 LBS

## 2025-08-12 DIAGNOSIS — E78.2 MIXED HYPERLIPIDEMIA: ICD-10-CM

## 2025-08-12 DIAGNOSIS — E78.2 ELEVATED CHOLESTEROL WITH ELEVATED TRIGLYCERIDES: ICD-10-CM

## 2025-08-12 DIAGNOSIS — F17.200 CURRENT EVERY DAY SMOKER: ICD-10-CM

## 2025-08-12 DIAGNOSIS — E55.9 VITAMIN D DEFICIENCY: Primary | ICD-10-CM

## 2025-08-12 DIAGNOSIS — L25.3 CONTACT DERMATITIS DUE TO OTHER CHEMICAL PRODUCT, UNSPECIFIED CONTACT DERMATITIS TYPE: ICD-10-CM

## 2025-08-12 PROCEDURE — 99214 OFFICE O/P EST MOD 30 MIN: CPT | Performed by: NURSE PRACTITIONER

## 2025-08-12 RX ORDER — ACETAMINOPHEN 160 MG
2000 TABLET,DISINTEGRATING ORAL DAILY
Qty: 90 CAPSULE | Refills: 3 | Status: SHIPPED | OUTPATIENT
Start: 2025-08-12 | End: 2026-08-12

## 2025-08-12 RX ORDER — ACETAMINOPHEN 160 MG
2000 TABLET,DISINTEGRATING ORAL DAILY
Qty: 90 CAPSULE | Refills: 3 | Status: SHIPPED | OUTPATIENT
Start: 2025-08-12 | End: 2025-08-12

## 2025-08-12 RX ORDER — FENOFIBRATE 145 MG/1
145 TABLET, FILM COATED ORAL DAILY
Qty: 30 TABLET | Refills: 5 | Status: SHIPPED | OUTPATIENT
Start: 2025-08-12 | End: 2025-08-12

## 2025-08-12 RX ORDER — CLOBETASOL PROPIONATE 0.5 MG/G
CREAM TOPICAL
COMMUNITY
Start: 2025-07-22

## 2025-08-12 RX ORDER — TRIAMCINOLONE ACETONIDE 0.25 MG/G
CREAM TOPICAL
COMMUNITY
Start: 2025-07-22

## 2025-08-12 RX ORDER — FENOFIBRATE 145 MG/1
145 TABLET, FILM COATED ORAL DAILY
Qty: 30 TABLET | Refills: 5 | Status: SHIPPED | OUTPATIENT
Start: 2025-08-12

## 2025-08-14 ENCOUNTER — OFFICE VISIT (OUTPATIENT)
Age: 55
End: 2025-08-14
Payer: COMMERCIAL

## 2025-08-14 VITALS
SYSTOLIC BLOOD PRESSURE: 122 MMHG | BODY MASS INDEX: 25.66 KG/M2 | DIASTOLIC BLOOD PRESSURE: 72 MMHG | HEART RATE: 85 BPM | WEIGHT: 183.3 LBS | HEIGHT: 71 IN | OXYGEN SATURATION: 96 %

## 2025-08-14 DIAGNOSIS — H01.02A SQUAMOUS BLEPHARITIS OF UPPER AND LOWER EYELIDS OF BOTH EYES: ICD-10-CM

## 2025-08-14 DIAGNOSIS — H01.02B SQUAMOUS BLEPHARITIS OF UPPER AND LOWER EYELIDS OF BOTH EYES: ICD-10-CM

## 2025-08-14 DIAGNOSIS — T78.40XD ALLERGIC REACTION, SUBSEQUENT ENCOUNTER: Primary | ICD-10-CM

## 2025-08-14 RX ORDER — OLOPATADINE HYDROCHLORIDE 1 MG/ML
1 SOLUTION OPHTHALMIC 2 TIMES DAILY
Qty: 5 ML | Refills: 1 | Status: SHIPPED | OUTPATIENT
Start: 2025-08-14

## 2025-08-14 RX ORDER — FAMOTIDINE 40 MG/1
40 TABLET, FILM COATED ORAL DAILY
Qty: 90 TABLET | OUTPATIENT
Start: 2025-08-14

## 2025-08-14 RX ORDER — LORATADINE 10 MG/1
10 TABLET ORAL DAILY
Qty: 30 TABLET | Refills: 0 | Status: SHIPPED | OUTPATIENT
Start: 2025-08-14 | End: 2025-09-13

## 2025-08-14 RX ORDER — LORATADINE 10 MG/1
10 TABLET ORAL DAILY
Qty: 30 TABLET | Refills: 0 | Status: SHIPPED | OUTPATIENT
Start: 2025-08-14 | End: 2025-08-14

## 2025-08-14 RX ORDER — FAMOTIDINE 40 MG/1
40 TABLET, FILM COATED ORAL DAILY
Qty: 30 TABLET | Refills: 0 | Status: SHIPPED | OUTPATIENT
Start: 2025-08-14 | End: 2025-08-14

## 2025-08-14 RX ORDER — FAMOTIDINE 40 MG/1
40 TABLET, FILM COATED ORAL DAILY
Qty: 30 TABLET | Refills: 0 | Status: SHIPPED | OUTPATIENT
Start: 2025-08-14

## 2025-08-28 ENCOUNTER — OFFICE VISIT (OUTPATIENT)
Age: 55
End: 2025-08-28
Payer: COMMERCIAL

## 2025-08-28 VITALS
BODY MASS INDEX: 25.86 KG/M2 | WEIGHT: 184.7 LBS | OXYGEN SATURATION: 97 % | SYSTOLIC BLOOD PRESSURE: 142 MMHG | HEIGHT: 71 IN | DIASTOLIC BLOOD PRESSURE: 74 MMHG | HEART RATE: 83 BPM

## 2025-08-28 DIAGNOSIS — T78.40XA ALLERGIC REACTION, INITIAL ENCOUNTER: Primary | ICD-10-CM

## 2025-08-28 RX ORDER — METHYLPREDNISOLONE ACETATE 40 MG/ML
80 INJECTION, SUSPENSION INTRA-ARTICULAR; INTRALESIONAL; INTRAMUSCULAR; SOFT TISSUE ONCE
Status: COMPLETED | OUTPATIENT
Start: 2025-08-28 | End: 2025-08-28

## 2025-08-28 RX ORDER — METHYLPREDNISOLONE ACETATE 80 MG/ML
80 INJECTION, SUSPENSION INTRA-ARTICULAR; INTRALESIONAL; INTRAMUSCULAR; SOFT TISSUE ONCE
Status: DISCONTINUED | OUTPATIENT
Start: 2025-08-28 | End: 2025-08-28

## 2025-08-28 RX ADMIN — METHYLPREDNISOLONE ACETATE 80 MG: 40 INJECTION, SUSPENSION INTRA-ARTICULAR; INTRALESIONAL; INTRAMUSCULAR; SOFT TISSUE at 15:29
